# Patient Record
Sex: FEMALE | Race: BLACK OR AFRICAN AMERICAN | Employment: PART TIME | URBAN - METROPOLITAN AREA
[De-identification: names, ages, dates, MRNs, and addresses within clinical notes are randomized per-mention and may not be internally consistent; named-entity substitution may affect disease eponyms.]

---

## 2017-06-10 ENCOUNTER — HOSPITAL ENCOUNTER (EMERGENCY)
Age: 52
Discharge: HOME | End: 2017-06-10
Attending: EMERGENCY MEDICINE
Payer: SELF-PAY

## 2017-06-10 VITALS
DIASTOLIC BLOOD PRESSURE: 81 MMHG | BODY MASS INDEX: 33.66 KG/M2 | SYSTOLIC BLOOD PRESSURE: 137 MMHG | OXYGEN SATURATION: 98 % | WEIGHT: 190 LBS | RESPIRATION RATE: 20 BRPM | HEART RATE: 84 BPM | TEMPERATURE: 97.4 F

## 2017-06-10 DIAGNOSIS — S30.861A TICK BITE OF ABDOMEN, INITIAL ENCOUNTER: Primary | ICD-10-CM
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
## 2017-06-10 PROCEDURE — 99283 EMERGENCY DEPT VISIT LOW MDM: CPT

## 2017-06-10 PROCEDURE — 74011250637 HC RX REV CODE- 250/637: Performed by: EMERGENCY MEDICINE

## 2017-06-10 RX ORDER — DOXYCYCLINE 100 MG/1
100 CAPSULE ORAL 2 TIMES DAILY
Qty: 20 CAP | Refills: 0 | Status: SHIPPED | OUTPATIENT
Start: 2017-06-10 | End: 2017-06-20

## 2017-06-10 RX ORDER — DOXYCYCLINE 100 MG/1
200 CAPSULE ORAL
Status: COMPLETED | OUTPATIENT
Start: 2017-06-10 | End: 2017-06-10

## 2017-06-10 RX ADMIN — DOXYCYCLINE HYCLATE 200 MG: 100 CAPSULE ORAL at 23:15

## 2017-06-11 NOTE — ED NOTES
Bedside and Verbal shift change report given to [address] (oncoming nurse) by Lilly Brambila RN (offgoing nurse). Report included the following information SBAR, Kardex, ED Summary, Procedure Summary, Accordion and Recent Results.

## 2017-06-11 NOTE — ED PROVIDER NOTES
HPI Comments:   10:43 PM Jeni Banks is a 47 y.o. female who presents to the ED for the evaluation of tick bite to her abd. Pt states that she noticed a tick on her abd about a week ago, but saw it fall off inter her hand while scratching this evening. Reports only a small erythremic bump to her abd. No fever, N/V, chills, diaphoresis or generalized myalgias. No relieving or exacerbating factors. No other complaints at this time. PMHx:  has a past medical history of Glaucoma; Glaucoma; Hypercholesterolemia; Hypertension; Miscarriage (); and Uterine fibroid. PCP: Jairo Fabry, MD       The history is provided by the patient. Past Medical History:   Diagnosis Date    Glaucoma     Glaucoma     Hypercholesterolemia     Hypertension     Miscarriage 2010    Uterine fibroid        Past Surgical History:   Procedure Laterality Date    EMBOLIZATION UTERINE FIBROID      HX  SECTION           Family History:   Problem Relation Age of Onset    Colon Polyps Sister     Colon Cancer Sister        Social History     Social History    Marital status:      Spouse name: N/A    Number of children: N/A    Years of education: N/A     Occupational History    Not on file. Social History Main Topics    Smoking status: Former Smoker     Types: Cigarettes     Quit date: 2012    Smokeless tobacco: Never Used    Alcohol use No    Drug use: No    Sexual activity: Not on file     Other Topics Concern    Not on file     Social History Narrative         ALLERGIES: Review of patient's allergies indicates no known allergies. Review of Systems   Constitutional: Negative. HENT: Negative. Eyes: Negative. Respiratory: Negative. Cardiovascular: Negative. Gastrointestinal: Negative. Endocrine: Negative. Genitourinary: Negative. Musculoskeletal: Negative. Skin: Positive for wound. Allergic/Immunologic: Negative. Neurological: Negative. Hematological: Negative. Psychiatric/Behavioral: Negative. All other systems reviewed and are negative. Vitals:    06/10/17 2232   BP: 137/81   Pulse: 84   Resp: 20   Temp: 97.4 °F (36.3 °C)   SpO2: 98%   Weight: 86.2 kg (190 lb)            Physical Exam   Constitutional: She is oriented to person, place, and time. She appears well-developed and well-nourished. No distress. HENT:   Head: Normocephalic. Right Ear: External ear normal.   Left Ear: External ear normal.   Mouth/Throat: No oropharyngeal exudate. Eyes: Conjunctivae and EOM are normal. Pupils are equal, round, and reactive to light. Right eye exhibits no discharge. Left eye exhibits no discharge. No scleral icterus. Neck: Normal range of motion. Neck supple. No JVD present. No tracheal deviation present. No thyromegaly present. Cardiovascular: Normal rate, regular rhythm, normal heart sounds and intact distal pulses. Exam reveals no gallop and no friction rub. No murmur heard. Pulmonary/Chest: Effort normal and breath sounds normal. No stridor. No respiratory distress. She has no wheezes. She has no rales. She exhibits no tenderness. Abdominal: Soft. Bowel sounds are normal. She exhibits no distension and no mass. There is no tenderness. There is no rebound and no guarding. Musculoskeletal: Normal range of motion. She exhibits no edema or tenderness. Lymphadenopathy:     She has no cervical adenopathy. Neurological: She is alert and oriented to person, place, and time. She displays normal reflexes. No cranial nerve deficit. She exhibits normal muscle tone. Coordination normal.   Skin: Skin is warm and dry. No rash noted. She is not diaphoretic. No erythema. No pallor. 1cm area of induration to abd. No FB seen   Nursing note and vitals reviewed.        MDM  Number of Diagnoses or Management Options  Tick bite of abdomen, initial encounter: new and requires workup  Risk of Complications, Morbidity, and/or Mortality  Presenting problems: low  Diagnostic procedures: low  Management options: low    Patient Progress  Patient progress: stable    ED Course       Procedures    Vitals:  Patient Vitals for the past 12 hrs:   Temp Pulse Resp BP SpO2   06/10/17 2232 97.4 °F (36.3 °C) 84 20 137/81 98 %   98% on RA, indicating adequate oxygenation. Medications ordered:   Medications - No data to display      Disposition:   10:45 PM: I have reassessed the patient and discussed their results and diagnosis. Pt will be discharged in stable condition. Patient understands and verbalizes agreement with plan. Diagnosis: No diagnosis found. Follow-up Information     Follow up With Details Comments Contact Info    Jennifer Gordon MD Call in 3 days  [address] [facility]      [address] [facility] EMERGENCY DEPT  As needed, If symptoms worsen [address] [place] [address]  [phone]           Patient's Medications   Start Taking    No medications on file   Continue Taking    BIMATOPROST (LUMIGAN) 0.03 % OPHTHALMIC DROPS    Administer 1 Drop to both eyes nightly. BRINZOLAMIDE (AZOPT) 1 % OPHTHALMIC SUSPENSION    Administer 1 drop to both eyes three (3) times daily. HYDROCHLOROTHIAZIDE (HYDRODIURIL) 25 MG TABLET    Take 1 Tab by mouth daily. IBUPROFEN (MOTRIN IB) 200 MG TABLET    Take 800 mg by mouth every eight (8) hours as needed for Pain. PRAVASTATIN (PRAVACHOL) 20 MG TABLET    Take 1 Tab by mouth nightly. RANITIDINE (ZANTAC) 150 MG TABLET    Take 1 Tab by mouth two (2) times a day. These Medications have changed    No medications on file   Stop Taking    CYCLOBENZAPRINE (FLEXERIL) 10 MG TABLET    Take 1 Tab by mouth three (3) times daily as needed for Muscle Spasm(s). OXYCODONE-ACETAMINOPHEN (PERCOCET) 5-325 MG PER TABLET    Take 1 Tab by mouth every four (4) hours as needed for Pain. Max Daily Amount: 6 Tabs.          SCRIBE ATTESTATION STATEMENT  Documented by: Diane Browne [place] for, and in the presence of, Edison Velázquez MD  10:45 PM   Signed by Leanne Mckeon, 6/10/2017 10:45 PM     PROVIDER ATTESTATION STATEMENT  I personally performed the services described in the documentation, reviewed the documentation, as recorded by the scribe in my presence, and it accurately and completely records my words and actions.   Edison Velázquez MD

## 2017-06-11 NOTE — DISCHARGE INSTRUCTIONS
Tick Bite: Care Instructions  Your Care Instructions    Ticks are small spiderlike animals. They bite to fasten themselves onto your skin and feed on your blood. Ticks can carry diseases. But most ticks do not carry diseases, and most tick bites do not cause serious health problems. Some people may have an allergic reaction to a tick bite. This reaction may be mild, with symptoms like itching and swelling. In rare cases, a severe allergic reaction may occur. Most of the time, all you need to do for a tick bite is relieve any symptoms you may have. Follow-up care is a key part of your treatment and safety. Be sure to make and go to all appointments, and call your doctor if you are having problems. It's also a good idea to know your test results and keep a list of the medicines you take. How can you care for yourself at home? · Put ice or a cold pack on the bite for 15 to 20 minutes once an hour. Put a thin cloth between the ice and your skin. · Try an over-the-counter medicine to relieve itching, redness, swelling, and pain. Be safe with medicines. Read and follow all instructions on the label. ¨ Take an antihistamine medicine, such as chlorpheniramine (Chlor-Trimeton) or diphenhydramine (Benadryl). These medicines may help relieve itching, redness, and swelling. ¨ Use a spray of local anesthetic that contains benzocaine, such as Solarcaine. It may help relieve pain. If your skin reacts to the spray, stop using it. ¨ Put calamine lotion on the skin. It may help relieve itching. To avoid tick bites  · Avoid ticks:  ¨ Learn where ticks are found in your community, and stay away from those areas if possible. ¨ Cover as much of your body as possible when you work or play in grassy or wooded areas. ¨ Use insect repellents, such as products containing DEET. You can spray them on your skin. ¨ Take steps to control ticks on your property if you live in an area where Lyme disease occurs.  Clear leaves, brush, tall grasses, woodpiles, and stone fences from around your house and the edges of your yard or garden. This may help get rid of ticks. · When you come in from outdoors, check your body for ticks, including your groin, head, and underarms. The ticks may be about the size of a sesame seed. If no one else can help you check for ticks on your scalp, comb your hair with a fine-tooth comb. · If you find a tick, remove it quickly. Use tweezers to grasp the tick as close to its mouth (the part in your skin) as possible. Slowly pull the tick straight out--do not twist or yank--until its mouth releases from your skin. · Ticks can come into your house on clothing, outdoor gear, and pets. These ticks can fall off and attach to you. ¨ Check your clothing and outdoor gear. Remove any ticks you find. Then put your clothing in a clothes dryer on high heat for 1 hour to kill any ticks that might remain. ¨ Check your pets for ticks after they have been outdoors. · When hiking in the woods, carry a small dry jar or ziplock bag. If you find a tick on your body, remove the tick and put it in the jar or bag. Store the container in the freezer so you can give it to your doctor if symptoms develop. The tick can be tested to learn whether it is carrying the bacteria that cause Lyme disease. When should you call for help? Call 911 anytime you think you may need emergency care. For example, call if:  · You have symptoms of a severe allergic reaction. These may include:  ¨ Sudden raised, red areas (hives) all over your body. ¨ Swelling of the throat, mouth, lips, or tongue. ¨ Trouble breathing. ¨ Passing out (losing consciousness). Or you may feel very lightheaded or suddenly feel weak, confused, or restless. Call your doctor now or seek immediate medical care if:  · You have signs of infection, such as:  ¨ Increased pain, swelling, warmth, or redness around the bite. ¨ Red streaks leading from the bite.   ¨ Pus draining from the bite. ¨ A fever. Watch closely for changes in your health, and be sure to contact your doctor if:  · You develop a new rash. · You have joint pain. · You are very tired. · You have flu-like symptoms. · You have symptoms for more than 1 week. Where can you learn more? Go to http://josiane-david.info/. Enter C954 in the search box to learn more about \"Tick Bite: Care Instructions. \"  Current as of: May 27, 2016  Content Version: 11.2  © 4346-8809 [employer]. Care instructions adapted under license by [employer] (which disclaims liability or warranty for this information). If you have questions about a medical condition or this instruction, always ask your healthcare professional. [address] any warranty or liability for your use of this information.

## 2017-06-11 NOTE — ED NOTES
Pt instructed to keep wound clean/dry, wash BID with soap/water and apply antibiotic ointment, to take antibiotics without difficulty, and to return for worsening pain/redness/swelling/fever/aches/chills/other concerns.

## 2017-06-23 ENCOUNTER — OFFICE VISIT (OUTPATIENT)
Dept: FAMILY MEDICINE CLINIC | Age: 52
End: 2017-06-23

## 2017-06-23 VITALS
HEIGHT: 63 IN | OXYGEN SATURATION: 95 % | SYSTOLIC BLOOD PRESSURE: 117 MMHG | TEMPERATURE: 98 F | BODY MASS INDEX: 34.55 KG/M2 | HEART RATE: 92 BPM | DIASTOLIC BLOOD PRESSURE: 71 MMHG | WEIGHT: 195 LBS | RESPIRATION RATE: 16 BRPM

## 2017-06-23 DIAGNOSIS — Z80.0 FAMILY HX OF COLON CANCER REQUIRING SCREENING COLONOSCOPY: ICD-10-CM
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
## 2017-06-23 RX ORDER — RANITIDINE 150 MG/1
150 TABLET, FILM COATED ORAL 2 TIMES DAILY
Qty: 180 TAB | Refills: 2 | Status: SHIPPED | OUTPATIENT
Start: 2017-06-23

## 2017-06-23 RX ORDER — PRAVASTATIN SODIUM 20 MG/1
20 TABLET ORAL
Qty: 90 TAB | Refills: 2 | Status: SHIPPED | OUTPATIENT
Start: 2017-06-23

## 2017-06-23 RX ORDER — HYDROCHLOROTHIAZIDE 25 MG/1
25 TABLET ORAL DAILY
Qty: 90 TAB | Refills: 2 | Status: SHIPPED | OUTPATIENT
Start: 2017-06-23

## 2017-06-23 NOTE — PROGRESS NOTES
No chief complaint on file. 1. Have you been to the ER, urgent care clinic since your last visit? Hospitalized since your last visit? Yes When: 6/10/17 Where: [facility] Reason for visit: Tick bite    2. Have you seen or consulted any other health care providers outside of the [address] [place] [address] since your last visit? No    3. When was your last Pap smear? 2015  When was your last Mammogram?2016   When was your last Colon screening? 20012    PMH/FH/Social Hx reviewed and updated as needed      Applicable screenings reviewed and updated as needed  Medication reconciliation performed. Patient does need medication refills. Health Maintenance reviewed.

## 2017-06-23 NOTE — PROGRESS NOTES
HISTORY OF PRESENT ILLNESS  Sheryl Chaves is a 47 y.o. female. Insect Bite   The history is provided by the patient. This is a new problem. Episode onset: Presents for ED f/u after a tick bite. She was seen 2 weeks ago and started on a course of doxycycline which she has completed. Denies any rash. No f/c/s. No ill feeling. No nausea/vomiting/diarrhea. The problem occurs constantly. The problem has been resolved. Pertinent negatives include no chest pain, no abdominal pain, no headaches and no shortness of breath. Nothing aggravates the symptoms. Nothing relieves the symptoms. Review of Systems   Constitutional: Negative. Respiratory: Negative for shortness of breath. Cardiovascular: Negative for chest pain. Gastrointestinal: Negative for abdominal pain. Neurological: Negative for headaches. Physical Exam   Constitutional: She appears well-developed and well-nourished. HENT:   Head: Normocephalic and atraumatic. Cardiovascular: Normal rate, regular rhythm and normal heart sounds. Pulmonary/Chest: Effort normal and breath sounds normal.   Skin: Skin is warm and dry. No rash noted. ASSESSMENT and PLAN  Tick bite: She was treated with doxycycline through the ED. She has not had a rash or other prodromal. Will follow for now.

## 2017-06-23 NOTE — PATIENT INSTRUCTIONS
Learning About How to Have a Healthy Back  What causes back pain? Back pain is often caused by overuse, strain, or injury. For example, people often hurt their backs playing sports or working in the yard, being jolted in a car accident, or lifting something too heavy. Aging plays a part too. Your bones and muscles tend to lose strength as you age, which makes injury more likely. The spongy discs between the bones of the spine (vertebrae) may suffer from wear and tear and no longer provide enough cushion between the bones. A disc that bulges or breaks open (herniated disc) can press on nerves, causing back pain. In some people, back pain is the result of arthritis, broken vertebrae caused by bone loss (osteoporosis), illness, or a spine problem. Although most people have back pain at one time or another, there are steps you can take to make it less likely. How can you have a healthy back? Reduce stress on your back through good posture  Slumping or slouching alone may not cause low back pain. But after the back has been strained or injured, bad posture can make pain worse. · Sleep in a position that maintains your back's normal curves and on a mattress that feels comfortable. Sleep on your side with a pillow between your knees, or sleep on your back with a pillow under your knees. These positions can reduce strain on your back. · Stand and sit up straight. \"Good posture\" generally means your ears, shoulders, and hips are in a straight line. · If you must stand for a long time, put one foot on a stool, ledge, or box. Switch feet every now and then. · Sit in a chair that is low enough to let you place both feet flat on the floor with both knees nearly level with your hips. If your chair or desk is too high, use a footrest to raise your knees. Place a small pillow, a rolled-up towel, or a lumbar roll in the curve of your back if you need extra support.   · Try a kneeling chair, which helps tilt your hips forward. This takes pressure off your lower back. · Try sitting on an exercise ball. It can rock from side to side, which helps keep your back loose. · When driving, keep your knees nearly level with your hips. Sit straight, and drive with both hands on the steering wheel. Your arms should be in a slightly bent position. Reduce stress on your back through careful lifting  · Squat down, bending at the hips and knees only. If you need to, put one knee to the floor and extend your other knee in front of you, bent at a right angle (half kneeling). · Press your chest straight forward. This helps keep your upper back straight while keeping a slight arch in your low back. · Hold the load as close to your body as possible, at the level of your belly button (navel). · Use your feet to change direction, taking small steps. · Lead with your hips as you change direction. Keep your shoulders in line with your hips as you move. · Set down your load carefully, squatting with your knees and hips only. Exercise and stretch your back  · Do some exercise on most days of the week, if your doctor says it is okay. You can walk, run, swim, or cycle. · Stretch your back muscles. Here are a few exercises to try:  Teresa Delgado on your back, and gently pull one bent knee to your chest. Put that foot back on the floor, and then pull the other knee to your chest.  ¨ Do pelvic tilts. Lie on your back with your knees bent. Tighten your stomach muscles. Pull your belly button (navel) in and up toward your ribs. You should feel like your back is pressing to the floor and your hips and pelvis are slightly lifting off the floor. Hold for 6 seconds while breathing smoothly. ¨ Sit with your back flat against a wall. · Keep your core muscles strong. The muscles of your back, belly (abdomen), and buttocks support your spine. ¨ Pull in your belly and imagine pulling your navel toward your spine. Hold this for 6 seconds, then relax.  Remember to keep breathing normally as you tense your muscles. ¨ Do curl-ups. Always do them with your knees bent. Keep your low back on the floor, and curl your shoulders toward your knees using a smooth, slow motion. Keep your arms folded across your chest. If this bothers your neck, try putting your hands behind your neck (not your head), with your elbows spread apart. ¨ Lie on your back with your knees bent and your feet flat on the floor. Tighten your belly muscles, and then push with your feet and raise your buttocks up a few inches. Hold this position 6 seconds as you continue to breathe normally, then lower yourself slowly to the floor. Repeat 8 to 12 times. ¨ If you like group exercise, try Pilates or yoga. These classes have poses that strengthen the core muscles. Lead a healthy lifestyle  · Stay at a healthy weight to avoid strain on your back. · Do not smoke. Smoking increases the risk of osteoporosis, which weakens the spine. If you need help quitting, talk to your doctor about stop-smoking programs and medicines. These can increase your chances of quitting for good. Where can you learn more? Go to http://josianeIdeal Implantdavid.info/. Enter L315 in the search box to learn more about \"Learning About How to Have a Healthy Back. \"  Current as of: March 21, 2017  Content Version: 11.3  © 8461-1882 [employer]. Care instructions adapted under license by [employer] (which disclaims liability or warranty for this information). If you have questions about a medical condition or this instruction, always ask your healthcare professional. [place] [address] any warranty or liability for your use of this information. Back Stretches: Exercises  Your Care Instructions  Here are some examples of exercises for stretching your back. Start each exercise slowly. Ease off the exercise if you start to have pain.   Your doctor or physical therapist will tell you when you can start these exercises and which ones will work best for you. How to do the exercises  Overhead stretch    1. Stand comfortably with your feet shoulder-width apart. 2. Looking straight ahead, raise both arms over your head and reach toward the ceiling. Do not allow your head to tilt back. 3. Hold for 15 to 30 seconds, then lower your arms to your sides. 4. Repeat 2 to 4 times. Side stretch    1. Stand comfortably with your feet shoulder-width apart. 2. Raise one arm over your head, and then lean to the other side. 3. Slide your hand down your leg as you let the weight of your arm gently stretch your side muscles. Hold for 15 to 30 seconds. 4. Repeat 2 to 4 times on each side. Press-up    1. Lie on your stomach, supporting your body with your forearms. 2. Press your elbows down into the floor to raise your upper back. As you do this, relax your stomach muscles and allow your back to arch without using your back muscles. As your press up, do not let your hips or pelvis come off the floor. 3. Hold for 15 to 30 seconds, then relax. 4. Repeat 2 to 4 times. Relax and rest    1. Lie on your back with a rolled towel under your neck and a pillow under your knees. Extend your arms comfortably to your sides. 2. Relax and breathe normally. 3. Remain in this position for about 10 minutes. 4. If you can, do this 2 or 3 times each day. Follow-up care is a key part of your treatment and safety. Be sure to make and go to all appointments, and call your doctor if you are having problems. It's also a good idea to know your test results and keep a list of the medicines you take. Where can you learn more? Go to http://josiane-david.info/. Enter R744 in the search box to learn more about \"Back Stretches: Exercises. \"  Current as of: March 21, 2017  Content Version: 11.3  © 0509-9210 [employer].  Care instructions adapted under license by [employer] (which disclaims liability or warranty for this information). If you have questions about a medical condition or this instruction, always ask your healthcare professional. [place] [address] any warranty or liability for your use of this information.

## 2017-06-23 NOTE — PROGRESS NOTES
Discharge instructions reviewed with patient    Medication list and understanding of medications reviewed with patient. OTC and herbal medications reviewed and added to med list if applicable  Barriers to adherence assessed. Guidance given regarding new medications this visit, including reason for taking this medicine, and common side effects. Given AVS and instructions on   Back stretches and exercises.

## 2017-06-23 NOTE — MR AVS SNAPSHOT
Visit Information Date & Time Provider Department Dept. Phone Encounter #  
 6/23/2017  3:00 PM Ignacio Gibbons, [address] [place] [address] [phone] [id number] Your Appointments 6/23/2017  3:00 PM  
Follow Up with Ignacio Gibbons MD  
[address] [facility]) Appt Note: tick bite [facility] [address] [place]  
  
   
 [facility] [id number] Upcoming Health Maintenance Date Due DTaP/Tdap/Td series (1 - Tdap) 3/28/1987 FOBT Q 1 YEAR AGE 50-75 3/28/2016 INFLUENZA AGE 9 TO ADULT 8/1/2017 PAP AKA CERVICAL CYTOLOGY 2/13/2018 BREAST CANCER SCRN MAMMOGRAM 4/25/2019 Allergies as of 6/23/2017  Review Complete On: 6/23/2017 By: Ignacio Gibbons MD  
 No Known Allergies Current Immunizations  Reviewed on 11/3/2014 Name Date Influenza Vaccine (Quad) PF 1/15/2016 11:00 AM  
 Influenza Vaccine PF 10/31/2014 Not reviewed this visit You Were Diagnosed With   
  
 Codes Comments Tick bite, subsequent encounter    -  Primary ICD-10-CM: W57. Reji Figueroa ICD-9-CM: E906.4 Vitals BP Pulse Temp Resp Height(growth percentile) Weight(growth percentile) 117/71 (BP 1 Location: Left arm, BP Patient Position: Sitting) 92 98 °F (36.7 °C) (Oral) 16 5' 3\" (1.6 m) 195 lb (88.5 kg) LMP SpO2 BMI OB Status Smoking Status 05/03/2017 95% 34.54 kg/m2 Having regular periods Former Smoker Vitals History BMI and BSA Data Body Mass Index Body Surface Area 34.54 kg/m 2 1.98 m 2 Preferred Pharmacy Pharmacy Name Phone [facility], [address] [place] [address] [phone] Your Updated Medication List  
  
   
This list is accurate as of: 6/23/17  1:37 PM.  Always use your most recent med list.  
  
  
  
  
 bimatoprost 0.03 % ophthalmic drops Commonly known as:  LUMIGAN Administer 1 Drop to both eyes nightly. brinzolamide 1 % ophthalmic suspension Commonly known as:  AZOPT Administer 1 drop to both eyes three (3) times daily. hydroCHLOROthiazide 25 mg tablet Commonly known as:  HYDRODIURIL Take 1 Tab by mouth daily. MOTRIN  mg tablet Generic drug:  ibuprofen Take 800 mg by mouth every eight (8) hours as needed for Pain.  
  
 pravastatin 20 mg tablet Commonly known as:  PRAVACHOL Take 1 Tab by mouth nightly. raNITIdine 150 mg tablet Commonly known as:  ZANTAC Take 1 Tab by mouth two (2) times a day. Patient Instructions Learning About How to Have a Healthy Back What causes back pain? Back pain is often caused by overuse, strain, or injury. For example, people often hurt their backs playing sports or working in the yard, being jolted in a car accident, or lifting something too heavy. Aging plays a part too. Your bones and muscles tend to lose strength as you age, which makes injury more likely. The spongy discs between the bones of the spine (vertebrae) may suffer from wear and tear and no longer provide enough cushion between the bones. A disc that bulges or breaks open (herniated disc) can press on nerves, causing back pain. In some people, back pain is the result of arthritis, broken vertebrae caused by bone loss (osteoporosis), illness, or a spine problem. Although most people have back pain at one time or another, there are steps you can take to make it less likely. How can you have a healthy back? Reduce stress on your back through good posture Slumping or slouching alone may not cause low back pain. But after the back has been strained or injured, bad posture can make pain worse. · Sleep in a position that maintains your back's normal curves and on a mattress that feels comfortable. Sleep on your side with a pillow between your knees, or sleep on your back with a pillow under your knees. These positions can reduce strain on your back. · Stand and sit up straight. \"Good posture\" generally means your ears, shoulders, and hips are in a straight line. · If you must stand for a long time, put one foot on a stool, ledge, or box. Switch feet every now and then. · Sit in a chair that is low enough to let you place both feet flat on the floor with both knees nearly level with your hips. If your chair or desk is too high, use a footrest to raise your knees. Place a small pillow, a rolled-up towel, or a lumbar roll in the curve of your back if you need extra support. · Try a kneeling chair, which helps tilt your hips forward. This takes pressure off your lower back. · Try sitting on an exercise ball. It can rock from side to side, which helps keep your back loose. · When driving, keep your knees nearly level with your hips. Sit straight, and drive with both hands on the steering wheel. Your arms should be in a slightly bent position. Reduce stress on your back through careful lifting · Squat down, bending at the hips and knees only. If you need to, put one knee to the floor and extend your other knee in front of you, bent at a right angle (half kneeling). · Press your chest straight forward. This helps keep your upper back straight while keeping a slight arch in your low back. · Hold the load as close to your body as possible, at the level of your belly button (navel). · Use your feet to change direction, taking small steps. · Lead with your hips as you change direction. Keep your shoulders in line with your hips as you move. · Set down your load carefully, squatting with your knees and hips only. Exercise and stretch your back · Do some exercise on most days of the week, if your doctor says it is okay. You can walk, run, swim, or cycle. · Stretch your back muscles. Here are a few exercises to try: ¨ Lie on your back, and gently pull one bent knee to your chest. Put that foot back on the floor, and then pull the other knee to your chest. 
 ¨ Do pelvic tilts. Lie on your back with your knees bent. Tighten your stomach muscles. Pull your belly button (navel) in and up toward your ribs. You should feel like your back is pressing to the floor and your hips and pelvis are slightly lifting off the floor. Hold for 6 seconds while breathing smoothly. ¨ Sit with your back flat against a wall. · Keep your core muscles strong. The muscles of your back, belly (abdomen), and buttocks support your spine. ¨ Pull in your belly and imagine pulling your navel toward your spine. Hold this for 6 seconds, then relax. Remember to keep breathing normally as you tense your muscles. ¨ Do curl-ups. Always do them with your knees bent. Keep your low back on the floor, and curl your shoulders toward your knees using a smooth, slow motion. Keep your arms folded across your chest. If this bothers your neck, try putting your hands behind your neck (not your head), with your elbows spread apart. ¨ Lie on your back with your knees bent and your feet flat on the floor. Tighten your belly muscles, and then push with your feet and raise your buttocks up a few inches. Hold this position 6 seconds as you continue to breathe normally, then lower yourself slowly to the floor. Repeat 8 to 12 times. ¨ If you like group exercise, try Pilates or yoga. These classes have poses that strengthen the core muscles. Lead a healthy lifestyle · Stay at a healthy weight to avoid strain on your back. · Do not smoke. Smoking increases the risk of osteoporosis, which weakens the spine. If you need help quitting, talk to your doctor about stop-smoking programs and medicines. These can increase your chances of quitting for good. Where can you learn more? Go to http://josiane-david.info/. Enter L315 in the search box to learn more about \"Learning About How to Have a Healthy Back. \" Current as of: March 21, 2017 Content Version: 11.3 © 4106-1902 Healthwise, Incorporated. Care instructions adapted under license by [employer] (which disclaims liability or warranty for this information). If you have questions about a medical condition or this instruction, always ask your healthcare professional. [address] any warranty or liability for your use of this information. Back Stretches: Exercises Your Care Instructions Here are some examples of exercises for stretching your back. Start each exercise slowly. Ease off the exercise if you start to have pain. Your doctor or physical therapist will tell you when you can start these exercises and which ones will work best for you. How to do the exercises Overhead stretch 1. Stand comfortably with your feet shoulder-width apart. 2. Looking straight ahead, raise both arms over your head and reach toward the ceiling. Do not allow your head to tilt back. 3. Hold for 15 to 30 seconds, then lower your arms to your sides. 4. Repeat 2 to 4 times. Side stretch 1. Stand comfortably with your feet shoulder-width apart. 2. Raise one arm over your head, and then lean to the other side. 3. Slide your hand down your leg as you let the weight of your arm gently stretch your side muscles. Hold for 15 to 30 seconds. 4. Repeat 2 to 4 times on each side. Press-up 1. Lie on your stomach, supporting your body with your forearms. 2. Press your elbows down into the floor to raise your upper back. As you do this, relax your stomach muscles and allow your back to arch without using your back muscles. As your press up, do not let your hips or pelvis come off the floor. 3. Hold for 15 to 30 seconds, then relax. 4. Repeat 2 to 4 times. Relax and rest 
 
1. Lie on your back with a rolled towel under your neck and a pillow under your knees. Extend your arms comfortably to your sides. 2. Relax and breathe normally. 3. Remain in this position for about 10 minutes. 4. If you can, do this 2 or 3 times each day. Follow-up care is a key part of your treatment and safety. Be sure to make and go to all appointments, and call your doctor if you are having problems. It's also a good idea to know your test results and keep a list of the medicines you take. Where can you learn more? Go to http://josiane-david.info/. Enter U719 in the search box to learn more about \"Back Stretches: Exercises. \" Current as of: March 21, 2017 Content Version: 11.3 © 7102-9920 [employer]. Care instructions adapted under license by [employer] (which disclaims liability or warranty for this information). If you have questions about a medical condition or this instruction, always ask your healthcare professional. [address] any warranty or liability for your use of this information. Introducing [facility] & HEALTH SERVICES! Brenton Mancuso introduces Clutch.io patient portal. Now you can access parts of your medical record, email your doctor's office, and request medication refills online. 1. In your internet browser, go to [email or web address] 2. Click on the First Time User? Click Here link in the Sign In box. You will see the New Member Sign Up page. 3. Enter your Clutch.io Access Code exactly as it appears below. You will not need to use this code after youve completed the sign-up process. If you do not sign up before the expiration date, you must request a new code. · Clutch.io Access Code: [email or web address] Expires: 9/8/2017 10:57 PM 
 
4. Enter the last four digits of your Social Security Number (xxxx) and Date of Birth (mm/dd/yyyy) as indicated and click Submit. You will be taken to the next sign-up page. 5. Create a Loud3rt ID. This will be your Clutch.io login ID and cannot be changed, so think of one that is secure and easy to remember. 6. Create a Loud3rt password. You can change your password at any time. 7. Enter your Password Reset Question and Answer. This can be used at a later time if you forget your password. 8. Enter your e-mail address. You will receive e-mail notification when new information is available in [address] 9. Click Sign Up. You can now view and download portions of your medical record. 10. Click the Download Summary menu link to download a portable copy of your medical information. If you have questions, please visit the Frequently Asked Questions section of the Bundlr website. Remember, Bundlr is NOT to be used for urgent needs. For medical emergencies, dial 911. Now available from your iPhone and Android! Please provide this summary of care documentation to your next provider. Your primary care clinician is listed as Vinny Tarango. If you have any questions after today's visit, please call [phone].

## 2017-11-17 ENCOUNTER — OFFICE VISIT (OUTPATIENT)
Dept: FAMILY MEDICINE CLINIC | Age: 52
End: 2017-11-17

## 2017-11-17 VITALS
RESPIRATION RATE: 19 BRPM | BODY MASS INDEX: 34.2 KG/M2 | TEMPERATURE: 98.1 F | OXYGEN SATURATION: 98 % | DIASTOLIC BLOOD PRESSURE: 90 MMHG | HEIGHT: 63 IN | HEART RATE: 84 BPM | WEIGHT: 193 LBS | SYSTOLIC BLOOD PRESSURE: 140 MMHG

## 2017-11-17 DIAGNOSIS — S93.402A SPRAIN OF LEFT ANKLE, UNSPECIFIED LIGAMENT, INITIAL ENCOUNTER: Primary | ICD-10-CM

## 2017-11-17 DIAGNOSIS — Z23 ENCOUNTER FOR IMMUNIZATION: ICD-10-CM

## 2017-11-17 DIAGNOSIS — Z00.00 HEALTHCARE MAINTENANCE: ICD-10-CM

## 2017-11-17 NOTE — MR AVS SNAPSHOT
Visit Information Date & Time Provider Department Dept. Phone Encounter #  
 11/17/2017  3:00 PM Nasreen Short, [address] [place] [address] [phone] [id number] Upcoming Health Maintenance Date Due DTaP/Tdap/Td series (1 - Tdap) 3/28/1987 FOBT Q 1 YEAR AGE 50-75 3/28/2016 Influenza Age 5 to Adult 8/1/2017 PAP AKA CERVICAL CYTOLOGY 2/13/2018 BREAST CANCER SCRN MAMMOGRAM 4/25/2019 Allergies as of 11/17/2017  Review Complete On: 11/17/2017 By: Nasreen Short MD  
 No Known Allergies Current Immunizations  Reviewed on 11/3/2014 Name Date Influenza Vaccine (Quad) PF 11/17/2017, 1/15/2016 11:00 AM  
 Influenza Vaccine PF 10/31/2014 Not reviewed this visit You Were Diagnosed With   
  
 Codes Comments Healthcare maintenance    -  Primary ICD-10-CM: Z00.00 ICD-9-CM: V70.0 Encounter for immunization     ICD-10-CM: J14 ICD-9-CM: V03.89 Vitals BP Pulse Temp Resp Height(growth percentile) Weight(growth percentile) 140/90 (BP 1 Location: Right arm, BP Patient Position: Sitting) 84 98.1 °F (36.7 °C) (Oral) 19 5' 3\" (1.6 m) 193 lb (87.5 kg) LMP SpO2 BMI OB Status Smoking Status 07/17/2017 (Approximate) 98% 34.19 kg/m2 Having regular periods Former Smoker BMI and BSA Data Body Mass Index Body Surface Area  
 34.19 kg/m 2 1.97 m 2 Preferred Pharmacy Pharmacy Name Phone [facility] [address], [address] [facility] Your Updated Medication List  
  
   
This list is accurate as of: 11/17/17  3:40 PM.  Always use your most recent med list.  
  
  
  
  
 bimatoprost 0.03 % ophthalmic drops Commonly known as:  LUMIGAN Administer 1 Drop to both eyes nightly. brinzolamide 1 % ophthalmic suspension Commonly known as:  AZOPT Administer 1 drop to both eyes three (3) times daily. hydroCHLOROthiazide 25 mg tablet Commonly known as:  HYDRODIURIL  
 Take 1 Tab by mouth daily. MOTRIN  mg tablet Generic drug:  ibuprofen Take 800 mg by mouth every eight (8) hours as needed for Pain.  
  
 pravastatin 20 mg tablet Commonly known as:  PRAVACHOL Take 1 Tab by mouth nightly. raNITIdine 150 mg tablet Commonly known as:  ZANTAC Take 1 Tab by mouth two (2) times a day. We Performed the Following INFLUENZA VIRUS VAC QUAD,SPLIT,PRESV FREE SYRINGE IM [id number] CPT(R)] To-Do List   
 12/17/2017 Lab:  OCCULT BLOOD, IMMUNOASSAY (FIT) Patient Instructions Little Leaguer's Elbow (Medial Apophysitis): Exercises Your Care Instructions Here are some examples of typical rehabilitation exercises for your condition. Start each exercise slowly. Ease off the exercise if you start to have pain. Your doctor or physical therapist will tell you when you can start these exercises and which ones will work best for you. How to do the exercises Wrist flexor stretch 1. Extend your affected arm in front of you. Your palm should face away from your body. 2. Bend back your wrist on your affected arm, pointing your hand up toward the ceiling. 3. With your other hand, gently bend your wrist farther until you feel a mild to moderate stretch in your forearm. 4. Hold for at least 15 to 30 seconds. 5. Repeat 2 to 4 times. 6. Repeat steps 1 through 5. But this time extend your affected arm in front of you with your palm facing up. Then bend back your wrist, pointing your hand toward the floor. Resisted wrist extension For the following exercises, you will need elastic exercise material, such as surgical tubing or Thera-Band. 1. Sit leaning forward with your legs slightly spread. Then place your affected forearm on your thigh with your hand and wrist in front of your knee. 2. Grasp one end of an exercise band with your palm down. Step on the other end. 3. Slowly bend your wrist upward for a count of 2. Then lower your wrist slowly to a count of 5. 
4. Repeat 8 to 12 times. Resisted wrist flexion 1. Sit leaning forward with your legs slightly spread. Then place your affected forearm on your thigh with your hand and wrist in front of your knee. 2. Grasp one end of an exercise band with your palm up. Step on the other end. 
3. Slowly bend your wrist upward for a count of 2. Then lower your wrist slowly to a count of 5. 
4. Repeat 8 to 12 times. Resisted radial deviation 1. Sit leaning forward with your legs slightly spread. Then place your affected forearm on your thigh with your hand and wrist in front of your knee. 2. Grasp one end of an exercise band with your hand facing toward your other thigh. Step on the other end of the band. 3. Slowly bend your wrist upward for a count of 2. Then lower your wrist slowly to a count of 5. 
4. Repeat 8 to 12 times. Resisted ulnar deviation 1. Sit leaning forward with your legs slightly spread. Then place your affected forearm on your thigh with your hand and wrist by the inside of your knee. 2. Grasp one end of an exercise band with your palm down. Step on the other end with the foot opposite the hand holding the band. 3. Slowly bend your wrist outward and toward your knee for a count of 2. Then slowly move your wrist back to the starting position to a count of 5. 
4. Repeat 8 to 12 times. Resisted forearm supination 1. Sit leaning forward with your legs slightly spread. Then place your affected forearm on your thigh with your hand and wrist in front of your knee. 2. Grasp one end of an exercise band with your palm down. Step on the other end. 3. Keeping your wrist straight, roll your palm outward and away from the inside of your thigh for a count of 2. Then slowly move your wrist back to the starting position to a count of 5. 
4. Repeat 8 to 12 times. Resisted forearm pronation 1. Sit leaning forward with your legs slightly spread. Then place your affected forearm on your thigh with your hand and wrist in front of your knee. 2. Grasp one end of an exercise band with your palm up. Step on the other end. 3. Keeping your wrist straight, roll your palm inward toward your thigh for a count of 2. Then slowly move your wrist back to the starting position to a count of 5. 
4. Repeat 8 to 12 times. Follow-up care is a key part of your treatment and safety. Be sure to make and go to all appointments, and call your doctor if you are having problems. It's also a good idea to know your test results and keep a list of the medicines you take. Where can you learn more? Go to http://josiane-david.info/. Enter R084 in the search box to learn more about \"Little Leaguer's Elbow (Medial Apophysitis): Exercises. \" Current as of: March 21, 2017 Content Version: 11.4 © 9667-2820 Xigen. Care instructions adapted under license by [employer] (which disclaims liability or warranty for this information). If you have questions about a medical condition or this instruction, always ask your healthcare professional. [place] [address] any warranty or liability for your use of this information. Introducing [facility] & HEALTH SERVICES! [facility] introduces [employer] patient portal. Now you can access parts of your medical record, email your doctor's office, and request medication refills online. 1. In your internet browser, go to [email or web address] 2. Click on the First Time User? Click Here link in the Sign In box. You will see the New Member Sign Up page. 3. Enter your Black Rhino Games Access Code exactly as it appears below. You will not need to use this code after youve completed the sign-up process. If you do not sign up before the expiration date, you must request a new code. · [employer] Access Code: [email or web address] Expires: 2/15/2018  3:40 PM 
 
4. Enter the last four digits of your Social Security Number (xxxx) and Date of Birth (mm/dd/yyyy) as indicated and click Submit. You will be taken to the next sign-up page. 5. Create a Ulta Beauty ID. This will be your Ulta Beauty login ID and cannot be changed, so think of one that is secure and easy to remember. 6. Create a Ulta Beauty password. You can change your password at any time. 7. Enter your Password Reset Question and Answer. This can be used at a later time if you forget your password. 8. Enter your e-mail address. You will receive e-mail notification when new information is available in [address] 9. Click Sign Up. You can now view and download portions of your medical record. 10. Click the Download Summary menu link to download a portable copy of your medical information. If you have questions, please visit the Frequently Asked Questions section of the Ulta Beauty website. Remember, Ulta Beauty is NOT to be used for urgent needs. For medical emergencies, dial 911. Now available from your iPhone and Android! Please provide this summary of care documentation to your next provider. Your primary care clinician is listed as Delfina Schwartz. If you have any questions after today's visit, please call [phone].

## 2017-11-17 NOTE — PROGRESS NOTES
Chief Complaint   Patient presents with    Ankle Pain     left ankle pain x 3 weeks- intermittent pain     1. Have you been to the ER, urgent care clinic since your last visit? Hospitalized since your last visit? No    2. Have you seen or consulted any other health care providers outside of the [address] [place] [address] since your last visit? No    3. Patient had PAP/MAMMO completed  8/2017- will forward records  When was your last Pap smear? When was your last Mammogram?   When was your last Colon screening? Colon Screening 2012 - Fit Kit ordered. PMH/FH/Social Hx reviewed and updated as needed      Applicable screenings reviewed and updated as needed  Medication reconciliation performed. Patient DOES  need medication refills. Health Maintenance reviewed.

## 2017-11-17 NOTE — PATIENT INSTRUCTIONS
Little Leaguer's Elbow (Medial Apophysitis): Exercises  Your Care Instructions  Here are some examples of typical rehabilitation exercises for your condition. Start each exercise slowly. Ease off the exercise if you start to have pain. Your doctor or physical therapist will tell you when you can start these exercises and which ones will work best for you. How to do the exercises  Wrist flexor stretch    1. Extend your affected arm in front of you. Your palm should face away from your body. 2. Bend back your wrist on your affected arm, pointing your hand up toward the ceiling. 3. With your other hand, gently bend your wrist farther until you feel a mild to moderate stretch in your forearm. 4. Hold for at least 15 to 30 seconds. 5. Repeat 2 to 4 times. 6. Repeat steps 1 through 5. But this time extend your affected arm in front of you with your palm facing up. Then bend back your wrist, pointing your hand toward the floor. Resisted wrist extension    For the following exercises, you will need elastic exercise material, such as surgical tubing or Thera-Band. 1. Sit leaning forward with your legs slightly spread. Then place your affected forearm on your thigh with your hand and wrist in front of your knee. 2. Grasp one end of an exercise band with your palm down. Step on the other end.  3. Slowly bend your wrist upward for a count of 2. Then lower your wrist slowly to a count of 5.  4. Repeat 8 to 12 times. Resisted wrist flexion    1. Sit leaning forward with your legs slightly spread. Then place your affected forearm on your thigh with your hand and wrist in front of your knee. 2. Grasp one end of an exercise band with your palm up. Step on the other end.  3. Slowly bend your wrist upward for a count of 2. Then lower your wrist slowly to a count of 5.  4. Repeat 8 to 12 times. Resisted radial deviation    1. Sit leaning forward with your legs slightly spread.  Then place your affected forearm on your thigh with your hand and wrist in front of your knee. 2. Grasp one end of an exercise band with your hand facing toward your other thigh. Step on the other end of the band. 3. Slowly bend your wrist upward for a count of 2. Then lower your wrist slowly to a count of 5.  4. Repeat 8 to 12 times. Resisted ulnar deviation    1. Sit leaning forward with your legs slightly spread. Then place your affected forearm on your thigh with your hand and wrist by the inside of your knee. 2. Grasp one end of an exercise band with your palm down. Step on the other end with the foot opposite the hand holding the band. 3. Slowly bend your wrist outward and toward your knee for a count of 2. Then slowly move your wrist back to the starting position to a count of 5.  4. Repeat 8 to 12 times. Resisted forearm supination    1. Sit leaning forward with your legs slightly spread. Then place your affected forearm on your thigh with your hand and wrist in front of your knee. 2. Grasp one end of an exercise band with your palm down. Step on the other end. 3. Keeping your wrist straight, roll your palm outward and away from the inside of your thigh for a count of 2. Then slowly move your wrist back to the starting position to a count of 5.  4. Repeat 8 to 12 times. Resisted forearm pronation    1. Sit leaning forward with your legs slightly spread. Then place your affected forearm on your thigh with your hand and wrist in front of your knee. 2. Grasp one end of an exercise band with your palm up. Step on the other end. 3. Keeping your wrist straight, roll your palm inward toward your thigh for a count of 2. Then slowly move your wrist back to the starting position to a count of 5.  4. Repeat 8 to 12 times. Follow-up care is a key part of your treatment and safety. Be sure to make and go to all appointments, and call your doctor if you are having problems.  It's also a good idea to know your test results and keep a list of the medicines you take. Where can you learn more? Go to http://josiane-david.info/. Enter K123 in the search box to learn more about \"Little Leaguer's Elbow (Medial Apophysitis): Exercises. \"  Current as of: March 21, 2017  Content Version: 11.4  © 6987-1482 [employer]. Care instructions adapted under license by [facility] (which disclaims liability or warranty for this information). If you have questions about a medical condition or this instruction, always ask your healthcare professional. [address] any warranty or liability for your use of this information.

## 2017-11-17 NOTE — PROGRESS NOTES
Kelly Dempsey is a 47 y.o. female who presents for routine immunizations. She denies any symptoms , reactions or allergies that would exclude them from being immunized today. Risks and adverse reactions were discussed and the VIS was given to them. All questions were addressed. She was observed for 15  min post injection. There were no reactions observed. Lucina Wong RN      Discharge instructions reviewed with patient    Medication list and understanding of medications reviewed with patient. OTC and herbal medications reviewed and added to med list if applicable  Barriers to adherence assessed. Guidance given regarding new medications this visit, including reason for taking this medicine, and common side effects.     Given AVS with instructions for arm exercises

## 2017-11-21 NOTE — PROGRESS NOTES
HISTORY OF PRESENT ILLNESS  Sheryl Rolle is a 47 y.o. female. Ankle Pain    The history is provided by the patient. This is a new problem. Episode onset: Presents with cc of L ankle pain. She reports she recently sprained the ankle and treated it conservatively with a brace with good effect. Her symptoms are largely resolved. Episode frequency: Continues to have minimal pain. No swelling. Able to walk normally. The problem has been rapidly improving. The quality of the pain is described as aching. The pain is mild. Pertinent negatives include no numbness, full range of motion and no stiffness. Review of Systems   Constitutional: Negative. Musculoskeletal: Negative for stiffness. Neurological: Negative for numbness. Physical Exam   Constitutional: She appears well-developed and well-nourished. Musculoskeletal:   L ankle:  No edema or bony deformity. ROM wnl. Mild TTP inferior to lateral malleolus. Strength 5/5       ASSESSMENT and PLAN  L ankle sprain: Nearly resolved. Continue with current care. F/u if symptoms persist or worsen.

## 2021-05-07 ENCOUNTER — OFFICE VISIT (OUTPATIENT)
Dept: ORTHOPEDIC SURGERY | Age: 56
End: 2021-05-07
Payer: COMMERCIAL

## 2021-05-07 ENCOUNTER — DOCUMENTATION ONLY (OUTPATIENT)
Dept: ORTHOPEDIC SURGERY | Age: 56
End: 2021-05-07

## 2021-05-07 VITALS — TEMPERATURE: 97.7 F | OXYGEN SATURATION: 100 % | HEART RATE: 85 BPM | BODY MASS INDEX: 34.83 KG/M2 | WEIGHT: 196.6 LBS

## 2021-05-07 DIAGNOSIS — M65.311 TRIGGER THUMB OF RIGHT HAND: Primary | ICD-10-CM

## 2021-05-07 PROCEDURE — 99203 OFFICE O/P NEW LOW 30 MIN: CPT | Performed by: ORTHOPAEDIC SURGERY

## 2021-05-07 RX ORDER — VITAMIN E 1000 UNIT
CAPSULE ORAL
COMMUNITY

## 2021-05-07 RX ORDER — AMLODIPINE BESYLATE 5 MG/1
TABLET ORAL
COMMUNITY
Start: 2020-07-02

## 2021-05-07 RX ORDER — ASPIRIN 325 MG
500 TABLET, DELAYED RELEASE (ENTERIC COATED) ORAL
COMMUNITY

## 2021-05-07 RX ORDER — METOPROLOL SUCCINATE 25 MG/1
TABLET, EXTENDED RELEASE ORAL
COMMUNITY
Start: 2021-04-06

## 2021-05-07 RX ORDER — ASPIRIN 81 MG/1
81 TABLET ORAL DAILY
COMMUNITY

## 2021-05-07 RX ORDER — CHOLECALCIFEROL (VITAMIN D3) 125 MCG
CAPSULE ORAL
COMMUNITY

## 2021-05-07 NOTE — PROGRESS NOTES
Yossi Benoit is a 55 y.o. female right handed unspecified employment. Worker's Compensation and legal considerations: none filed. Vitals:    21 1205   Pulse: 85   Temp: 97.7 °F (36.5 °C)   SpO2: 100%   Weight: 196 lb 9.6 oz (89.2 kg)   PainSc:   6   PainLoc: Finger           Chief Complaint   Patient presents with    Thumb Pain     Right         HPI: Patient presents today with complaints of right thumb pain and locking. She previously has had right thumb A1 pulley injections. The first one last year lasted for about 6 months and she has had one recently in December that did not help at all. Date of onset: 2020    Injury: No    Prior Treatment:  Yes: Comment: Right thumb A1 pulley injection by another provider    Numbness/ Tingling: No      ROS: Review of Systems - General ROS: negative  Psychological ROS: negative  ENT ROS: negative  Allergy and Immunology ROS: negative  Hematological and Lymphatic ROS: negative  Respiratory ROS: no cough, shortness of breath, or wheezing  Cardiovascular ROS: no chest pain or dyspnea on exertion  Gastrointestinal ROS: no abdominal pain, change in bowel habits, or black or bloody stools  Musculoskeletal ROS: positive for - pain in thumb - right  Neurological ROS: negative  Dermatological ROS: negative    Past Medical History:   Diagnosis Date    Glaucoma     Glaucoma     Hypercholesterolemia     Hypertension     Miscarriage 2010    Uterine fibroid        Past Surgical History:   Procedure Laterality Date    EMBOLIZATION UTERINE FIBROID      HX  SECTION         Current Outpatient Medications   Medication Sig Dispense Refill    aspirin delayed-release 81 mg tablet Take 81 mg by mouth daily.       amLODIPine (NORVASC) 5 mg tablet amlodipine 5 mg tablet   TAKE 1 TABLET BY MOUTH ONCE DAILY FOR 90 DAYS      metoprolol succinate (TOPROL-XL) 25 mg XL tablet metoprolol succinate ER 25 mg tablet,extended release 24 hr   TAKE 1 2 (ONE HALF) TABLET BY MOUTH ONCE DAILY      pravastatin (PRAVACHOL) 20 mg tablet Take 1 Tab by mouth nightly. 90 Tab 2    hydroCHLOROthiazide (HYDRODIURIL) 25 mg tablet Take 1 Tab by mouth daily. 90 Tab 2    bimatoprost (LUMIGAN) 0.03 % ophthalmic drops Administer 1 Drop to both eyes nightly. 2.5 mL 2    ibuprofen (MOTRIN IB) 200 mg tablet Take 800 mg by mouth every eight (8) hours as needed for Pain.  omega 3-dha-epa-fish oil 60- mg cap Take 500 mg by mouth.  ascorbic acid, vitamin C, (Vitamin C) 1,000 mg tablet       cholecalciferol, vitamin D3, (Vitamin D3) 50 mcg (2,000 unit) tab 1 capsule      raNITIdine (ZANTAC) 150 mg tablet Take 1 Tab by mouth two (2) times a day. 180 Tab 2    brinzolamide (AZOPT) 1 % ophthalmic suspension Administer 1 drop to both eyes three (3) times daily. No Known Allergies        PE:     Physical Exam  Vitals signs and nursing note reviewed. Constitutional:       General: She is not in acute distress. Appearance: Normal appearance. She is not ill-appearing. Neck:      Musculoskeletal: Normal range of motion. Cardiovascular:      Pulses: Normal pulses. Pulmonary:      Effort: Pulmonary effort is normal.   Musculoskeletal: Normal range of motion. General: Swelling and tenderness present. No deformity or signs of injury. Right lower leg: No edema. Left lower leg: No edema. Skin:     General: Skin is warm and dry. Capillary Refill: Capillary refill takes less than 2 seconds. Findings: No bruising or erythema. Neurological:      General: No focal deficit present. Mental Status: She is alert and oriented to person, place, and time.    Psychiatric:         Mood and Affect: Mood normal.            Hand:    Examination L Digit(s) R Digit(s)   1st CMC Tenderness -  -    1st CMC Grind -  -    Melo Nodes -  -    [place] Nodes -  -    A1 Pulley Tenderness -  + Th   Triggering -  +    UCL Instability -  -    RCL Instability -  - Lateral Stress Pain -  -    Palmar Cords -  -    Tabletop test -  -    Garrod's Pads -  -     Strength       Pinch Strength         ROM: Full        Imaging:     None indicated        ICD-10-CM ICD-9-CM    1. Trigger thumb of right hand  M65.311 727.03 SCHEDULE SURGERY         Plan:     Schedule right thumb A1 pulley release the patient's earliest convenience. Follow-up and Dispositions    · Return for H&P and consent.           Plan was reviewed with patient, who verbalized agreement and understanding of the plan

## 2021-11-30 ENCOUNTER — HOSPITAL ENCOUNTER (EMERGENCY)
Age: 56
Discharge: HOME | End: 2021-11-30
Attending: STUDENT IN AN ORGANIZED HEALTH CARE EDUCATION/TRAINING PROGRAM
Payer: COMMERCIAL

## 2021-11-30 ENCOUNTER — APPOINTMENT (OUTPATIENT)
Dept: VASCULAR SURGERY | Age: 56
End: 2021-11-30
Attending: STUDENT IN AN ORGANIZED HEALTH CARE EDUCATION/TRAINING PROGRAM
Payer: COMMERCIAL

## 2021-11-30 VITALS
RESPIRATION RATE: 18 BRPM | HEART RATE: 85 BPM | WEIGHT: 200 LBS | OXYGEN SATURATION: 100 % | DIASTOLIC BLOOD PRESSURE: 76 MMHG | SYSTOLIC BLOOD PRESSURE: 142 MMHG | TEMPERATURE: 98.4 F | HEIGHT: 63 IN | BODY MASS INDEX: 35.44 KG/M2

## 2021-11-30 DIAGNOSIS — M79.662 PAIN OF LEFT CALF: Primary | ICD-10-CM

## 2021-11-30 LAB — D DIMER PPP FEU-MCNC: 0.94 UG/ML(FEU)

## 2021-11-30 PROCEDURE — 99282 EMERGENCY DEPT VISIT SF MDM: CPT

## 2021-11-30 PROCEDURE — 85379 FIBRIN DEGRADATION QUANT: CPT

## 2021-11-30 PROCEDURE — 93971 EXTREMITY STUDY: CPT

## 2021-11-30 RX ORDER — DICLOFENAC SODIUM 10 MG/G
4 GEL TOPICAL 4 TIMES DAILY
Qty: 1 EACH | Refills: 3 | Status: SHIPPED | OUTPATIENT
Start: 2021-11-30

## 2021-11-30 NOTE — DISCHARGE INSTRUCTIONS
Take tylenol 500mg every 6-8 hours as needed for pain. Use the voltaren gel to manage your pain, massage it in. Follow up with your PCP as needed.

## 2021-11-30 NOTE — ED PROVIDER NOTES
EMERGENCY DEPARTMENT HISTORY AND PHYSICAL EXAM    1:00 PM    Date: 11/30/2021  Patient Name: Chuck White    History of Presenting Illness     Chief Complaint   Patient presents with    Leg Pain       History Provided By: Patient  Location/Duration/Severity/Modifying factors   HPI  Chuck White is a 55 y.o. female with asthma history of hypertension presenting for evaluation of left calf pain. Patient says that the day after Thanksgiving, approximately 4 days ago she started to have some pain in the left calf, mild to moderate. It is worse when she is walking, and when she does so it radiates up towards her knee/thigh and down towards her foot. She says that at night this leg swells, but then it goes down during the day. She denies any trauma to this area. Has not been taking anything for the pain because she does not like to take pain medication. She saw her PCP earlier today who referred her to the emergency department for evaluation of potential DVT. She has no history of DVT or PE, reports no long distance travel but is a smoker. She denies any cough, hemoptysis, chest pain or shortness of breath. No other medical complaints. PCP: Ashley Benavides MD    Current Outpatient Medications   Medication Sig Dispense Refill    diclofenac (VOLTAREN) 1 % gel Apply 4 g to affected area four (4) times daily. Use dosing card to measure dose. Do not apply to open wounds 1 Each 3    aspirin delayed-release 81 mg tablet Take 81 mg by mouth daily.  omega 3-dha-epa-fish oil 60- mg cap Take 500 mg by mouth.       ascorbic acid, vitamin C, (Vitamin C) 1,000 mg tablet       cholecalciferol, vitamin D3, (Vitamin D3) 50 mcg (2,000 unit) tab 1 capsule      amLODIPine (NORVASC) 5 mg tablet amlodipine 5 mg tablet   TAKE 1 TABLET BY MOUTH ONCE DAILY FOR 90 DAYS      metoprolol succinate (TOPROL-XL) 25 mg XL tablet metoprolol succinate ER 25 mg tablet,extended release 24 hr   TAKE 1 2 (ONE HALF) TABLET BY MOUTH ONCE DAILY      pravastatin (PRAVACHOL) 20 mg tablet Take 1 Tab by mouth nightly. 90 Tab 2    hydroCHLOROthiazide (HYDRODIURIL) 25 mg tablet Take 1 Tab by mouth daily. 90 Tab 2    raNITIdine (ZANTAC) 150 mg tablet Take 1 Tab by mouth two (2) times a day. 180 Tab 2    bimatoprost (LUMIGAN) 0.03 % ophthalmic drops Administer 1 Drop to both eyes nightly. 2.5 mL 2    ibuprofen (MOTRIN IB) 200 mg tablet Take 800 mg by mouth every eight (8) hours as needed for Pain.  brinzolamide (AZOPT) 1 % ophthalmic suspension Administer 1 drop to both eyes three (3) times daily. Past History     Past Medical History:  Past Medical History:   Diagnosis Date    Glaucoma     Glaucoma     Hypercholesterolemia     Hypertension     Miscarriage 2010    Uterine fibroid        Past Surgical History:  Past Surgical History:   Procedure Laterality Date    EMBOLIZATION UTERINE FIBROID      HX  SECTION         Family History:  Family History   Problem Relation Age of Onset    Colon Polyps Sister     Colon Cancer Sister     Heart Disease Mother        Social History:  Social History     Tobacco Use    Smoking status: Former Smoker     Types: Cigarettes     Quit date: 2012     Years since quittin.1    Smokeless tobacco: Never Used   Substance Use Topics    Alcohol use: No    Drug use: No       Allergies:  No Known Allergies    I reviewed and confirmed the above information with patient and updated as necessary. Review of Systems     Review of Systems   Constitutional: Negative for diaphoresis and fever. HENT: Negative for ear pain and sore throat. Eyes:        No acute change in vision   Respiratory: Negative for cough and shortness of breath. Cardiovascular: Negative for chest pain and leg swelling. Gastrointestinal: Negative for abdominal pain and vomiting. Genitourinary: Negative for dysuria. Musculoskeletal: Positive for myalgias. Negative for neck pain.    Skin: Negative for wound. Neurological: Negative for weakness and headaches. Physical Exam     Visit Vitals  BP (!) 142/76 (BP 1 Location: Left upper arm, BP Patient Position: At rest)   Pulse 85   Temp 98.4 °F (36.9 °C)   Resp 18   Ht 5' 3\" (1.6 m)   Wt 90.7 kg (200 lb)   SpO2 100%   BMI 35.43 kg/m²       Physical Exam  Vitals and nursing note reviewed. Constitutional:       Comments: Pleasant adult female resting comfortably   HENT:      Mouth/Throat:      Mouth: Mucous membranes are moist.   Eyes:      Pupils: Pupils are equal, round, and reactive to light. Cardiovascular:      Rate and Rhythm: Normal rate and regular rhythm. Pulses: Normal pulses. Comments: DP and TP pulses 2+ bilaterally  Pulmonary:      Effort: Pulmonary effort is normal.      Breath sounds: Normal breath sounds. Abdominal:      Palpations: Abdomen is soft. Tenderness: There is no abdominal tenderness. Musculoskeletal:         General: Tenderness (Tenderness to the left calf, no muscle spasm appreciated. Small area of ecchymosis about the size of a quarter overlying this area.) present. No swelling or signs of injury. Normal range of motion. Cervical back: Normal range of motion. Skin:     General: Skin is warm. Neurological:      General: No focal deficit present. Mental Status: She is alert and oriented to person, place, and time. Mental status is at baseline. Diagnostic Study Results     Labs -  Recent Results (from the past 24 hour(s))   D DIMER    Collection Time: 11/30/21  1:00 PM   Result Value Ref Range    D DIMER 0.94 (H) <0.46 ug/ml(FEU)         Radiologic Studies -   DUPLEX LOWER EXT VENOUS LEFT    (Results Pending)           Medical Decision Making   I am the first provider for this patient. I reviewed the vital signs, available nursing notes, past medical history, past surgical history, family history and social history.     Vital Signs-Reviewed the patient's vital signs. Records Reviewed: Nursing Notes and Old Medical Records (Time of Review: 1:00 PM)    Provider Notes (Medical Decision Making):   MDM  71-year-old female here with left calf pain, potential for muscle strain but sent here by PCP to rule out DVT. Doubt fracture. Strong pulses symmetrically, doubt arterial insufficiency. No chest pain or shortness of breath to suggest PE.    ED Course: Progress Notes, Reevaluation, and Consults:  Patient is afebrile, slightly hypertensive but otherwise hemodynamically normal  Exam is reassuring, as noted above    We will obtain a D dimer, and escalate work-up if necessary. Do not feel that x-rays are indicated. D-dimer is positive however PVL shows no acute DVT    Patient appropriate discharge home, will send with Voltaren gel, Tylenol. Signs and symptoms prompting return to the ED were discussed. She was discharged home in stable condition. Procedures    Diagnosis     Clinical Impression:   1. Pain of left calf        Disposition: Home    Follow-up Information     Follow up With Specialties Details Why Contact Info    Claudia Nunn MD Family Medicine Schedule an appointment as soon as possible for a visit   [address] [place], [place] [address] [phone] [address] [facility] EMERGENCY DEPT Emergency Medicine  As needed, If symptoms worsen [address] [facility]  [phone]           Patient's Medications   Start Taking    DICLOFENAC (VOLTAREN) 1 % GEL    Apply 4 g to affected area four (4) times daily. Use dosing card to measure dose. Do not apply to open wounds   Continue Taking    AMLODIPINE (NORVASC) 5 MG TABLET    amlodipine 5 mg tablet   TAKE 1 TABLET BY MOUTH ONCE DAILY FOR 90 DAYS    ASCORBIC ACID, VITAMIN C, (VITAMIN C) 1,000 MG TABLET        ASPIRIN DELAYED-RELEASE 81 MG TABLET    Take 81 mg by mouth daily. BIMATOPROST (LUMIGAN) 0.03 % OPHTHALMIC DROPS    Administer 1 Drop to both eyes nightly.     BRINZOLAMIDE (AZOPT) 1 % OPHTHALMIC SUSPENSION    Administer 1 drop to both eyes three (3) times daily. CHOLECALCIFEROL, VITAMIN D3, (VITAMIN D3) 50 MCG (2,000 UNIT) TAB    1 capsule    HYDROCHLOROTHIAZIDE (HYDRODIURIL) 25 MG TABLET    Take 1 Tab by mouth daily. IBUPROFEN (MOTRIN IB) 200 MG TABLET    Take 800 mg by mouth every eight (8) hours as needed for Pain. METOPROLOL SUCCINATE (TOPROL-XL) 25 MG XL TABLET    metoprolol succinate ER 25 mg tablet,extended release 24 hr   TAKE 1 2 (ONE HALF) TABLET BY MOUTH ONCE DAILY    OMEGA 3-DHA-EPA-FISH OIL 60- MG CAP    Take 500 mg by mouth. PRAVASTATIN (PRAVACHOL) 20 MG TABLET    Take 1 Tab by mouth nightly. RANITIDINE (ZANTAC) 150 MG TABLET    Take 1 Tab by mouth two (2) times a day. These Medications have changed    No medications on file   Stop Taking    No medications on file       Manolo Page MD   Emergency Medicine   November 30, 2021, 1:00 PM     This note is dictated utilizing Dragon voice recognition software. Unfortunately this leads to occasional typographical errors using the voice recognition. I apologize in advance if the situation occurs. If questions occur please do not hesitate to contact me directly.     Diana Dinh MD

## 2021-11-30 NOTE — ED TRIAGE NOTES
Pt c/o of left leg pain starting last Friday. The pain the pt states starts in her calf & shoots up her knee & thigh while walking. Pt sent her by PCP to be evaluated for blood clot.

## 2023-04-13 PROBLEM — D21.9 FIBROIDS: Status: ACTIVE | Noted: 2023-04-13

## 2023-04-13 PROBLEM — R26.89 IMBALANCE: Status: ACTIVE | Noted: 2023-04-13

## 2023-04-13 PROBLEM — M43.16 SPONDYLOLISTHESIS AT L4-L5 LEVEL: Status: ACTIVE | Noted: 2023-04-13

## 2023-07-20 ENCOUNTER — OFFICE VISIT (OUTPATIENT)
Age: 58
End: 2023-07-20
Payer: COMMERCIAL

## 2023-07-20 VITALS
OXYGEN SATURATION: 97 % | HEIGHT: 63 IN | WEIGHT: 203.8 LBS | HEART RATE: 103 BPM | TEMPERATURE: 97.4 F | BODY MASS INDEX: 36.11 KG/M2

## 2023-07-20 DIAGNOSIS — M43.16 SPONDYLOLISTHESIS AT L4-L5 LEVEL: Primary | ICD-10-CM

## 2023-07-20 DIAGNOSIS — R20.0 NUMBNESS AND TINGLING OF BOTH FEET: ICD-10-CM

## 2023-07-20 DIAGNOSIS — R20.2 NUMBNESS AND TINGLING OF BOTH FEET: ICD-10-CM

## 2023-07-20 DIAGNOSIS — M54.50 LUMBAR PAIN: ICD-10-CM

## 2023-07-20 PROCEDURE — 99213 OFFICE O/P EST LOW 20 MIN: CPT | Performed by: NURSE PRACTITIONER

## 2023-07-20 RX ORDER — BIMATOPROST 0.1 MG/ML
SOLUTION/ DROPS OPHTHALMIC
COMMUNITY
Start: 2023-05-19

## 2023-07-20 RX ORDER — PREGABALIN 75 MG/1
CAPSULE ORAL
Qty: 90 CAPSULE | Refills: 1 | Status: SHIPPED | OUTPATIENT
Start: 2023-07-20 | End: 2023-09-18

## 2023-07-20 NOTE — PROGRESS NOTES
Chief complaint   Chief Complaint   Patient presents with    Back Problem       History of Present Illness:  Milo Witt is a  63 y.o.  female who comes in today after last being seen as a new patient by Dr. Mark Novak on April 13, 2023. She has back pain with leg pain that is burning in nature. The leg pain is intermittent. The back pain is worse than the leg pain. She states the leg pain radiates into the anterior thighs to just below her knees. She does get some numbness and tingling. Dr. Sabina Doe started her on Lyrica 75 mg twice a day which she states did help a little bit. It does tend to make her drowsy but she is able to handle that. She is also on diclofenac 75 mg twice a day through her PCP. She did have an x-ray done that showed a anterolisthesis L4 on 5 with degenerative disc disease at L5-S1. 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 She smokes an average of 1 cigarette/day. She denies fever bowel bladder dysfunction. Physical Exam: The patient is a 67-year-old female well-developed well-nourished who is alert and oriented with a normal mood and affect. She has a full weightbearing nonantalgic gait. She did not use any assist device. Her gait is a little bit slow. She has 4 out of 5 strength bilateral lower extremities. Negative straight leg raise. She has pain with hyperextension lumbar spine. Assessment and Plan: This is a patient who has anterolisthesis and degenerative disc disease that gives her back pain and leg pain. I will increase her Lyrica to 70 5 in the morning and 150 at night. Hopefully this will not make her too sleepy. I have printed off a physical therapy exercise program for her to do at home as she states she cannot get out to do physical therapy on outpatient basis. She will continue her diclofenac. We will see her back in 6 weeks. If she is not improved we will get a MRI of the lumbar spine.     Pat was seen today for

## 2023-07-31 ENCOUNTER — HOSPITAL ENCOUNTER (EMERGENCY)
Facility: HOSPITAL | Age: 58
Discharge: HOME | End: 2023-07-31
Attending: STUDENT IN AN ORGANIZED HEALTH CARE EDUCATION/TRAINING PROGRAM
Payer: COMMERCIAL

## 2023-07-31 ENCOUNTER — APPOINTMENT (OUTPATIENT)
Facility: HOSPITAL | Age: 58
End: 2023-07-31
Payer: COMMERCIAL

## 2023-07-31 VITALS
RESPIRATION RATE: 18 BRPM | OXYGEN SATURATION: 97 % | HEART RATE: 107 BPM | DIASTOLIC BLOOD PRESSURE: 78 MMHG | HEIGHT: 63 IN | BODY MASS INDEX: 35.79 KG/M2 | TEMPERATURE: 99.1 F | WEIGHT: 202 LBS | SYSTOLIC BLOOD PRESSURE: 140 MMHG

## 2023-07-31 DIAGNOSIS — R51.9 ACUTE NONINTRACTABLE HEADACHE, UNSPECIFIED HEADACHE TYPE: Primary | ICD-10-CM

## 2023-07-31 LAB
ANION GAP SERPL CALC-SCNC: 6 MMOL/L (ref 3–18)
BASOPHILS # BLD: 0 K/UL (ref 0–0.1)
BASOPHILS NFR BLD: 1 % (ref 0–2)
BUN SERPL-MCNC: 25 MG/DL (ref 7–18)
BUN/CREAT SERPL: 18 (ref 12–20)
CALCIUM SERPL-MCNC: 9.7 MG/DL (ref 8.5–10.1)
CHLORIDE SERPL-SCNC: 98 MMOL/L (ref 100–111)
CO2 SERPL-SCNC: 31 MMOL/L (ref 21–32)
CREAT SERPL-MCNC: 1.39 MG/DL (ref 0.6–1.3)
DIFFERENTIAL METHOD BLD: ABNORMAL
EOSINOPHIL # BLD: 0.1 K/UL (ref 0–0.4)
EOSINOPHIL NFR BLD: 2 % (ref 0–5)
ERYTHROCYTE [DISTWIDTH] IN BLOOD BY AUTOMATED COUNT: 13.8 % (ref 11.6–14.5)
GLUCOSE BLD STRIP.AUTO-MCNC: 105 MG/DL (ref 70–110)
GLUCOSE SERPL-MCNC: 103 MG/DL (ref 74–99)
HCT VFR BLD AUTO: 41.6 % (ref 35–45)
HGB BLD-MCNC: 13.6 G/DL (ref 12–16)
IMM GRANULOCYTES # BLD AUTO: 0 K/UL (ref 0–0.04)
IMM GRANULOCYTES NFR BLD AUTO: 0 % (ref 0–0.5)
LYMPHOCYTES # BLD: 1.9 K/UL (ref 0.9–3.6)
LYMPHOCYTES NFR BLD: 46 % (ref 21–52)
MAGNESIUM SERPL-MCNC: 2.3 MG/DL (ref 1.6–2.6)
MCH RBC QN AUTO: 28.6 PG (ref 24–34)
MCHC RBC AUTO-ENTMCNC: 32.7 G/DL (ref 31–37)
MCV RBC AUTO: 87.6 FL (ref 78–100)
MONOCYTES # BLD: 0.3 K/UL (ref 0.05–1.2)
MONOCYTES NFR BLD: 8 % (ref 3–10)
NEUTS SEG # BLD: 1.7 K/UL (ref 1.8–8)
NEUTS SEG NFR BLD: 42 % (ref 40–73)
NRBC # BLD: 0 K/UL (ref 0–0.01)
NRBC BLD-RTO: 0 PER 100 WBC
PLATELET # BLD AUTO: 212 K/UL (ref 135–420)
PMV BLD AUTO: 9.3 FL (ref 9.2–11.8)
POTASSIUM SERPL-SCNC: 3.6 MMOL/L (ref 3.5–5.5)
RBC # BLD AUTO: 4.75 M/UL (ref 4.2–5.3)
SODIUM SERPL-SCNC: 135 MMOL/L (ref 136–145)
WBC # BLD AUTO: 4.1 K/UL (ref 4.6–13.2)

## 2023-07-31 PROCEDURE — 93005 ELECTROCARDIOGRAM TRACING: CPT | Performed by: STUDENT IN AN ORGANIZED HEALTH CARE EDUCATION/TRAINING PROGRAM

## 2023-07-31 PROCEDURE — 6360000002 HC RX W HCPCS: Performed by: STUDENT IN AN ORGANIZED HEALTH CARE EDUCATION/TRAINING PROGRAM

## 2023-07-31 PROCEDURE — 85025 COMPLETE CBC W/AUTO DIFF WBC: CPT

## 2023-07-31 PROCEDURE — 96374 THER/PROPH/DIAG INJ IV PUSH: CPT

## 2023-07-31 PROCEDURE — 80048 BASIC METABOLIC PNL TOTAL CA: CPT

## 2023-07-31 PROCEDURE — 99284 EMERGENCY DEPT VISIT MOD MDM: CPT

## 2023-07-31 PROCEDURE — 70450 CT HEAD/BRAIN W/O DYE: CPT

## 2023-07-31 PROCEDURE — 96361 HYDRATE IV INFUSION ADD-ON: CPT

## 2023-07-31 PROCEDURE — 83735 ASSAY OF MAGNESIUM: CPT

## 2023-07-31 PROCEDURE — 96375 TX/PRO/DX INJ NEW DRUG ADDON: CPT

## 2023-07-31 PROCEDURE — 2580000003 HC RX 258: Performed by: STUDENT IN AN ORGANIZED HEALTH CARE EDUCATION/TRAINING PROGRAM

## 2023-07-31 PROCEDURE — 82962 GLUCOSE BLOOD TEST: CPT

## 2023-07-31 RX ORDER — METOCLOPRAMIDE HYDROCHLORIDE 5 MG/ML
10 INJECTION INTRAMUSCULAR; INTRAVENOUS
Status: COMPLETED | OUTPATIENT
Start: 2023-07-31 | End: 2023-07-31

## 2023-07-31 RX ORDER — KETOROLAC TROMETHAMINE 15 MG/ML
15 INJECTION, SOLUTION INTRAMUSCULAR; INTRAVENOUS
Status: COMPLETED | OUTPATIENT
Start: 2023-07-31 | End: 2023-07-31

## 2023-07-31 RX ORDER — 0.9 % SODIUM CHLORIDE 0.9 %
500 INTRAVENOUS SOLUTION INTRAVENOUS ONCE
Status: COMPLETED | OUTPATIENT
Start: 2023-07-31 | End: 2023-07-31

## 2023-07-31 RX ORDER — DIPHENHYDRAMINE HYDROCHLORIDE 50 MG/ML
25 INJECTION INTRAMUSCULAR; INTRAVENOUS
Status: COMPLETED | OUTPATIENT
Start: 2023-07-31 | End: 2023-07-31

## 2023-07-31 RX ADMIN — METOCLOPRAMIDE 10 MG: 5 INJECTION, SOLUTION INTRAMUSCULAR; INTRAVENOUS at 16:26

## 2023-07-31 RX ADMIN — SODIUM CHLORIDE 500 ML: 9 INJECTION, SOLUTION INTRAVENOUS at 16:26

## 2023-07-31 RX ADMIN — KETOROLAC TROMETHAMINE 15 MG: 15 INJECTION, SOLUTION INTRAMUSCULAR; INTRAVENOUS at 16:40

## 2023-07-31 RX ADMIN — DIPHENHYDRAMINE HYDROCHLORIDE 25 MG: 50 INJECTION, SOLUTION INTRAMUSCULAR; INTRAVENOUS at 16:25

## 2023-07-31 ASSESSMENT — ENCOUNTER SYMPTOMS
CHEST TIGHTNESS: 0
ABDOMINAL PAIN: 0
SHORTNESS OF BREATH: 0
DIARRHEA: 0
NAUSEA: 0
VOMITING: 0

## 2023-07-31 ASSESSMENT — PAIN DESCRIPTION - DESCRIPTORS: DESCRIPTORS: ACHING

## 2023-07-31 ASSESSMENT — LIFESTYLE VARIABLES
HOW MANY STANDARD DRINKS CONTAINING ALCOHOL DO YOU HAVE ON A TYPICAL DAY: PATIENT DOES NOT DRINK
HOW OFTEN DO YOU HAVE A DRINK CONTAINING ALCOHOL: NEVER

## 2023-07-31 ASSESSMENT — PAIN SCALES - GENERAL
PAINLEVEL_OUTOF10: 7
PAINLEVEL_OUTOF10: 7

## 2023-07-31 ASSESSMENT — PAIN DESCRIPTION - LOCATION: LOCATION: HEAD

## 2023-07-31 ASSESSMENT — PAIN DESCRIPTION - ORIENTATION: ORIENTATION: POSTERIOR;MID;LOWER

## 2023-07-31 NOTE — ED NOTES
Discharge instructions reviewed with patient. Patient verbalized understanding. Patient advised to follow up as directed on discharge instructions. Patient denies questions, needs or concerns at this time. Patient verbalized understanding. No s/sx of distress noted.         Мария Gunderson RN  07/31/23 2357

## 2023-07-31 NOTE — ED PROVIDER NOTES
EMERGENCY DEPARTMENT HISTORY AND PHYSICAL EXAM      Date: 7/31/2023  Patient Name: Kevin Lopez    History of Presenting Illness     Chief Complaint   Patient presents with    Headache    Numbness       Patient is a 46-year-old female with history of hypertension, hypercholesterolemia, glaucoma, migraine headaches presenting to the emergency department for evaluation of headache and numbness to the left side of her face. Patient states that headache has been ongoing the past 3 days. On the left side of her head. States that she felt like the left side of her face started not numb earlier today. Also states that her arms are feeling weak when she was brushing her hair. She denies any facial droop or difficulty with speech                PCP: Leavy Paget, MD    Current Facility-Administered Medications   Medication Dose Route Frequency Provider Last Rate Last Admin    sodium chloride 0.9 % bolus 500 mL  500 mL IntraVENous Once Memo Craw, .9 mL/hr at 07/31/23 1626 500 mL at 07/31/23 1626     Current Outpatient Medications   Medication Sig Dispense Refill    LUMIGAN 0.01 % SOLN ophthalmic drops       pregabalin (LYRICA) 75 MG capsule 1 cap in the am and 2 caps in the pm 90 capsule 1    diclofenac (VOLTAREN) 75 MG EC tablet 1 tablet daily as needed      pregabalin (LYRICA) 75 MG capsule One po qhs x 1 week, then may increase to one po BID as tolerated. (Patient taking differently: Take 1 capsule by mouth.  One po qhs x 1 week, then may increase to one po BID as tolerated.) 60 capsule 2    amLODIPine (NORVASC) 5 MG tablet amlodipine 5 mg tablet   TAKE 1 TABLET BY MOUTH ONCE DAILY FOR 90 DAYS      ascorbic acid (VITAMIN C) 1000 MG tablet Take 1 tablet by mouth daily      aspirin 81 MG EC tablet Take 1 tablet by mouth daily      bimatoprost (LUMIGAN) 0.03 % ophthalmic drops Place 1 drop into both eyes nightly      Cholecalciferol 50 MCG (2000 UT) TABS Take 1 tablet by mouth Daily      diclofenac

## 2023-07-31 NOTE — ED TRIAGE NOTES
Patient  A/O x 4, presented to the ED with complaint of headache x 3 days, numbness to left side of face x 3 hours ago. Patient states she was brushing her hair x 1 hour ago and noted her left arm was weak.

## 2023-08-01 LAB
EKG ATRIAL RATE: 101 BPM
EKG DIAGNOSIS: NORMAL
EKG P AXIS: 49 DEGREES
EKG P-R INTERVAL: 164 MS
EKG Q-T INTERVAL: 330 MS
EKG QRS DURATION: 70 MS
EKG QTC CALCULATION (BAZETT): 427 MS
EKG R AXIS: 3 DEGREES
EKG T AXIS: 15 DEGREES
EKG VENTRICULAR RATE: 101 BPM

## 2023-08-01 PROCEDURE — 93010 ELECTROCARDIOGRAM REPORT: CPT | Performed by: INTERNAL MEDICINE

## 2023-11-02 ENCOUNTER — OFFICE VISIT (OUTPATIENT)
Age: 58
End: 2023-11-02
Payer: COMMERCIAL

## 2023-11-02 VITALS
BODY MASS INDEX: 35.44 KG/M2 | HEIGHT: 63 IN | OXYGEN SATURATION: 99 % | TEMPERATURE: 97 F | HEART RATE: 94 BPM | WEIGHT: 200 LBS

## 2023-11-02 DIAGNOSIS — M43.16 SPONDYLOLISTHESIS AT L4-L5 LEVEL: ICD-10-CM

## 2023-11-02 DIAGNOSIS — M48.062 SPINAL STENOSIS OF LUMBAR REGION WITH NEUROGENIC CLAUDICATION: Primary | ICD-10-CM

## 2023-11-02 PROCEDURE — 99214 OFFICE O/P EST MOD 30 MIN: CPT | Performed by: PHYSICAL MEDICINE & REHABILITATION

## 2023-11-02 RX ORDER — MELOXICAM 15 MG/1
TABLET ORAL
COMMUNITY
Start: 2023-10-19

## 2023-11-02 RX ORDER — PREGABALIN 75 MG/1
CAPSULE ORAL
Qty: 270 CAPSULE | Refills: 0 | Status: SHIPPED | OUTPATIENT
Start: 2023-11-02 | End: 2024-01-01

## 2023-11-02 NOTE — PROGRESS NOTES
Shanita Christensen presents today for   Chief Complaint   Patient presents with    Lower Back Pain       Is someone accompanying this pt? no    Is the patient using any DME equipment during OV? no    Coordination of Care:  1. Have you been to the ER, urgent care clinic since your last visit? no  Hospitalized since your last visit? no    2. Have you seen or consulted any other health care providers outside of the [address] [place] [address] since your last visit? Yes, PCP Include any pap smears or colon screening.  no

## 2023-11-02 NOTE — PROGRESS NOTES
[facility]    [address] [place] [address] Dr  Phone: [phone]  Fax: [phone]        Chente Marlow  : 1965  PCP: Fermin Wilhelm MD    PROGRESS NOTE      ASSESSMENT AND PLAN    Pat was seen today for lower back pain. Diagnoses and all orders for this visit:    Spinal stenosis of lumbar region with neurogenic claudication  -     MRI LUMBAR SPINE WO CONTRAST; Future  -     pregabalin (LYRICA) 75 MG capsule; 1 cap in the am and 2 caps in the pm    Spondylolisthesis at L4-L5 level  -     MRI LUMBAR SPINE WO CONTRAST; Future  -     pregabalin (LYRICA) 75 MG capsule; 1 cap in the am and 2 caps in the pm  -     diclofenac sodium (VOLTAREN) 1 % GEL; Apply 4 g topically 4 times daily        Vicki Reis is a 63 y.o. female with progressive symptoms of lumbar stenosis. Recommend lumbar MRI due to progressive symptoms, failure of NSAIDs, HEP, and Lyrica. She is concerned about her co-pay and will look into her insurance. Mild benefit with Lyrica 75 twice daily. She did not increase to 75/150 mg as directed last visit. Unable to increase daytime dose due to somnolence. Refilled Lyrica, instructed to take 75/150 mg. Refilled diclofenac gel. Recently started on meloxicam by Dr. Hemant Escalona. Advised patient not to combine this with other OTC or prescription NSAIDs. Follow-up and Dispositions    Return in about 4 weeks (around 2023) for fu MRI/CT/EDx. HISTORY OF PRESENT ILLNESS      Pat Koenig is a 63 y.o. female presents for follow up of low back pain. LV NP Young 2023, inc Lyrica to 75/150, given HEP, cont. Voltaren gel. Patient continues with low back pain with intermittent left sciatic pain, described as feeling like pouring cold water on it. She has difficulty with her daily routine including twisting and reaching being her back while using the bathroom. She cannot tolerate prolonged walking.  She

## 2023-11-03 RX ORDER — PREGABALIN 75 MG/1
CAPSULE ORAL
Qty: 60 CAPSULE | Refills: 0 | OUTPATIENT
Start: 2023-11-03

## 2024-01-26 ENCOUNTER — HOSPITAL ENCOUNTER (OUTPATIENT)
Facility: HOSPITAL | Age: 59
Setting detail: RECURRING SERIES
Discharge: HOME | End: 2024-01-29
Payer: COMMERCIAL

## 2024-01-26 PROCEDURE — 97161 PT EVAL LOW COMPLEX 20 MIN: CPT

## 2024-01-26 NOTE — THERAPY EVALUATION
sup  [] pro  [] sidelying   [] Other:     General Health:  Red Flags Indicated? [] Yes    [x] No  [] Yes [x] No Recent weight change (If yes, due to dieting? [] Yes  [] No)   [] Yes [x] No Weakness in legs during walking  [] Yes [x] No Unremitting pain at night  [] Yes [x] No Abdominal pain or problems  [] Yes [x] No Rectal bleeding  [] Yes [x] No Feet more cold or painful in cold weather  [] Yes [x] No Menstrual irregularities  [] Yes [x] No Blood or pain with urination  [] Yes [x] No Dysfunction of bowel or bladder  [] Yes [x] No Recent illness within past 3 weeks (i.e, cold, flu)  [] Yes [x] No Numbness/tingling in buttock/genitalia region    Past History/Treatments:  see above    Diagnostic Tests: [] Lab work [x] X-rays    [] CT [x] MRI     [] Other:  Results: per epic: L XR AP/lat/flex/ext 4V 4/13/2023 (I personally reviewed these images): calcified fibroid in pelvis, grade one listhesis L4-5, no instability  L MRI 2015: multilevel degenerative changes, mild edema L4-5 with mild epidural lipomatosis  Spine surgery consult: non     MRI of the R knee:   1.  No evidence of significant meniscal or ligamentous injury.     2. Mild distal quadriceps and proximal patellar tendinopathy.     3. Mild to moderate tricompartmental degenerative chondromalacia.       Functional Status  Prior level of function: walking at least 10 minutes and in grocery store  Present functional limitations: limited with bending, twisting, limited walking for <5 minutes   What position do you sleep in?:sidelying    OBJECTIVE  Posture:  Lateral Shift: [] R    [] L     [] +  [x] -  Kyphosis: [x] Increased [] Decreased   []  WNL  Lordosis:  [] Increased [x] Decreased   [] WNL  Pelvic symmetry: [x] WNL    [] Other:    Gait:  [] Normal     [x] Abnormal: reduced stance time on R leg with knee immobilizer intact    Active Movements: [] N/A   [] Too acute   [] Other:  ROM % AROM % PROM Comments:pain, area   Forward flexion 40-60 80%     Extension

## 2024-01-26 NOTE — THERAPY EVALUATION
ADLs.  Evaluation:  7/10  2. Patient will demonstrate AROM Lumbar Spine Flexion by 20%, extension 25%, rotation by 50% to increase ease of ADLs.  Evaluation:  Active Lumbar Spine ROM Flexion 80%, Extension 25%, Rotation R and L 25% with pain with rotation and extension   3. Patient will increase FOTO score to  42 to indicate increased functional mobility.  Evaluation:  24  4. Patient will tolerate standing for  30 minutes.  Evaluation:  <10 minutes      Frequency / Duration: Patient to be seen 2 times per week for 8 WEEKS    Patient/ Caregiver education and instruction: Diagnosis, prognosis, self care and exercises [x]  Plan of care has been reviewed with PTA    Certification Period: 01/26/2024 to 03/22/2024    Lesli Max, PT       1/26/2024       2:00 PM    Payor: JULIANNE / Plan: JULIANNE BOND ([facility] / Product Type: *No Product type* /     Physician signature required for Medicare, Medicaid, Worker's Comp, Direct Access   ===================================================================  I certify that the above Therapy Services are being furnished while the patient is under my care. I agree with the treatment plan and certify that this therapy is necessary.    Physician's Signature:_________________________   DATE:_________   TIME:________                           Medardo Chris MD    ** Signature, Date and Time must be completed for valid certification **  [facility]. Thank you

## 2024-02-12 ENCOUNTER — HOSPITAL ENCOUNTER (OUTPATIENT)
Facility: HOSPITAL | Age: 59
Setting detail: RECURRING SERIES
Discharge: HOME | End: 2024-02-15
Payer: COMMERCIAL

## 2024-02-12 PROCEDURE — 97110 THERAPEUTIC EXERCISES: CPT

## 2024-02-12 PROCEDURE — 97112 NEUROMUSCULAR REEDUCATION: CPT

## 2024-02-12 PROCEDURE — G0283 ELEC STIM OTHER THAN WOUND: HCPCS

## 2024-02-12 PROCEDURE — 97530 THERAPEUTIC ACTIVITIES: CPT

## 2024-02-15 ENCOUNTER — HOSPITAL ENCOUNTER (OUTPATIENT)
Facility: HOSPITAL | Age: 59
Setting detail: RECURRING SERIES
Discharge: HOME | End: 2024-02-18
Payer: COMMERCIAL

## 2024-02-15 PROCEDURE — G0283 ELEC STIM OTHER THAN WOUND: HCPCS

## 2024-02-15 PROCEDURE — 97530 THERAPEUTIC ACTIVITIES: CPT

## 2024-02-15 PROCEDURE — 97112 NEUROMUSCULAR REEDUCATION: CPT

## 2024-02-15 PROCEDURE — 97110 THERAPEUTIC EXERCISES: CPT

## 2024-02-15 NOTE — PROGRESS NOTES
PHYSICAL / OCCUPATIONAL THERAPY - DAILY TREATMENT NOTE    Patient Name: Pat Jeffries    Date: 2/15/2024    : 1965  Insurance: Payor: JULIANNE / Plan: JULIANNE BOND HMO / Product Type: *No Product type* /      Patient  verified Yes     Visit #   Current / Total 3 16   Time   In / Out 1145 1230   Pain   In / Out 7 6   Subjective Functional Status/Changes: Patient states she felt better after having the stem at last therapy session.      TREATMENT AREA =  Other low back pain [M54.59]    OBJECTIVE    Estim Unattended (UNTIMED), with HEAT type/location: prone LS     Min Rationale   10 decrease pain and increase tissue extensibility to improve patient's ability to progress to PLOF and address remaining functional goals.     Skin assessment post-treatment:   Intact      Therapeutic Procedures:     15940 Therapeutic Exercise (timed):  increase ROM, strength, coordination, balance, and proprioception to improve patient's ability to progress to PLOF and address remaining functional goals.   Tx Min Billable or 1:1 Min   (if diff from Tx Min) Details:   13   See flow sheet as applicable      48537 Neuromuscular Re-Education (timed):  improve balance, coordination, kinesthetic sense, posture, core stability and proprioception to improve patient's ability to develop conscious control of individual muscles and awareness of position of extremities in order to progress to PLOF and address remaining functional goals.   Tx Min Billable or 1:1 Min   (if diff from Tx Min) Details:   12   See flow sheet as applicable      82287 Therapeutic Activity (timed):  use of dynamic activities replicating functional movements to increase ROM, strength, coordination, balance, and proprioception in order to improve patient's ability to progress to PLOF and address remaining functional goals.   Tx Min Billable or 1:1 Min   (if diff from Tx Min) Details:   10   See flow sheet as applicable         35    [facility] Totals Reminder: bill

## 2024-03-01 ENCOUNTER — HOSPITAL ENCOUNTER (OUTPATIENT)
Facility: HOSPITAL | Age: 59
Setting detail: RECURRING SERIES
Discharge: HOME | End: 2024-03-04
Payer: COMMERCIAL

## 2024-03-01 PROCEDURE — 97112 NEUROMUSCULAR REEDUCATION: CPT

## 2024-03-01 PROCEDURE — G0283 ELEC STIM OTHER THAN WOUND: HCPCS

## 2024-03-01 PROCEDURE — 97110 THERAPEUTIC EXERCISES: CPT

## 2024-03-01 PROCEDURE — 97530 THERAPEUTIC ACTIVITIES: CPT

## 2024-03-01 NOTE — PROGRESS NOTES
[facility]  [address] [address] Ph: [phone] Fx: [phone]  PHYSICAL THERAPY PROGRESS NOTE  Patient Name: Pat Jeffries : 1965   Treatment/Medical Diagnosis: Other low back pain [M54.59]   Referral Source: Medardo Chris MD     Date of Initial Visit: 24 Attended Visits: 4 Missed Visits: 0     SUMMARY OF TREATMENT  Patient has only had 3 follow up appt since initial evaluation on 24 due to awaiting insurance auth and then conflicting schedules. Patient continues to have high ratings of pain and limited standing tolerance of 8 mins.     CURRENT STATUS  Short Term Goals: To be accomplished in 4 WEEKS  1.  Patient will become proficient in their HEP and will be compliant in performing that program.  Evaluation:  instructed on HEP with issued program/   Current: progressing: patient reports performing HEP 3x a week.      Long Term Goals: To be accomplished in 8 WEEKS  1. Patient's pain level will be 4/10 with activity in order to improve patient's ability to perform normal ADLs.  Evaluation:  7/10  Current; regressed: pain at worse 8/10.   2. Patient will demonstrate AROM Lumbar Spine Flexion by 20%, extension 25%, rotation by 50% to increase ease of ADLs.  Evaluation:  Active Lumbar Spine ROM Flexion 80%, Extension 25%, Rotation R and L 25% with pain with rotation and extension   Current: progressing; AROM lumbar spine flexion 50% limited, extension limited 50%, B sidebend limited 25%, B rotation limited 50%.   3. Patient will increase FOTO score to  42 to indicate increased functional mobility.  Evaluation:  24  Current; regressed; FOTO = 19.   4. Patient will tolerate standing for  30 minutes.  Evaluation:  <10 minutes  Current:  regressed: standing 8 mins.          Payor: JULIANNE / Plan: JULIANNE VANTA HMO / Product Type: *No Product type* /     Non-Medicare, can change goals, can adjust or add frequency duration, no 
Active Lumbar Spine ROM Flexion 80%, Extension 25%, Rotation R and L 25% with pain with rotation and extension   Current: progressing; AROM lumbar spine flexion 50% limited, extension limited 50%, B sidebend limited 25%, B rotation limited 50%. 3/1/24  3. Patient will increase FOTO score to  42 to indicate increased functional mobility.  Evaluation:  24  Current; regressed; FOTO = 19. 3/1/24  4. Patient will tolerate standing for  30 minutes.  Evaluation:  <10 minutes  Current:  regressed: standing 8 mins. 3/1/24     Next PN/ RC duetoday  Auth due 4/30/24; 16 visits       PLAN  - Continue Plan of Care    FRANCISCA PARK, [facility]    3/1/2024    2:26 PM    Future Appointments   Date Time Provider Department Center   3/14/2024  1:40 PM Sonia Momin MD [facility]

## 2024-03-04 ENCOUNTER — HOSPITAL ENCOUNTER (OUTPATIENT)
Facility: HOSPITAL | Age: 59
Setting detail: RECURRING SERIES
Discharge: HOME | End: 2024-03-07
Payer: COMMERCIAL

## 2024-03-04 ENCOUNTER — TELEPHONE (OUTPATIENT)
Facility: HOSPITAL | Age: 59
End: 2024-03-04

## 2024-03-04 PROCEDURE — 97110 THERAPEUTIC EXERCISES: CPT

## 2024-03-04 PROCEDURE — 97140 MANUAL THERAPY 1/> REGIONS: CPT

## 2024-03-04 PROCEDURE — G0283 ELEC STIM OTHER THAN WOUND: HCPCS

## 2024-03-04 PROCEDURE — 97112 NEUROMUSCULAR REEDUCATION: CPT

## 2024-03-04 PROCEDURE — 97530 THERAPEUTIC ACTIVITIES: CPT

## 2024-03-04 NOTE — TELEPHONE ENCOUNTER
Called pt to offer her an opening we had today at 1340 since she was on our Wait List. She accepted.

## 2024-03-04 NOTE — PROGRESS NOTES
performing that program.  AT PN:  progressing: patient reports performing HEP 3x a week.     Next PN/ RC due 4/1/24  Auth due 16th visit or 4/30/24    PLAN  - Continue Plan of Care    BEBO PARK PTA    3/4/2024    1:43 PM    Future Appointments   Date Time Provider Department Center   3/5/2024  3:40 PM Lesli Max, PT [facility]   3/11/2024  2:20 PM Tejal Lopez PTA [facility]   3/14/2024  1:40 PM Sonia Momin MD [facility]   3/15/2024  2:20 PM Lesli Max, PT [facility]   3/18/2024  1:40 PM Bebo Park PTA [facility]   3/22/2024  1:40 PM Bebo Park PTA [facility]   3/25/2024  1:40 PM Bebo Park, PTA [facility]

## 2024-03-05 ENCOUNTER — HOSPITAL ENCOUNTER (OUTPATIENT)
Facility: HOSPITAL | Age: 59
Setting detail: RECURRING SERIES
Discharge: HOME | End: 2024-03-08
Payer: COMMERCIAL

## 2024-03-05 PROCEDURE — 97110 THERAPEUTIC EXERCISES: CPT

## 2024-03-05 PROCEDURE — 97530 THERAPEUTIC ACTIVITIES: CPT

## 2024-03-05 PROCEDURE — G0283 ELEC STIM OTHER THAN WOUND: HCPCS

## 2024-03-05 PROCEDURE — 97112 NEUROMUSCULAR REEDUCATION: CPT

## 2024-03-05 NOTE — PROGRESS NOTES
PHYSICAL / OCCUPATIONAL THERAPY - DAILY TREATMENT NOTE    Patient Name: Pat Jeffries    Date: 3/5/2024    : 1965  Insurance: Payor: JULIANNE / Plan: JULIANNE BOND HMO / Product Type: *No Product type* /      Patient  verified Yes     Visit #   Current / Total 2 8   Time   In / Out 3:41 4:30   Pain   In / Out 5 4.5   Subjective Functional Status/Changes: Patient reports the back is doing better, the MRI shows potential tear in the R knee with a slight tear but a lot of inflammation, she is wearing the brace. She goes back in a few weeks to see MD and then potentially with injection to the R knee, has steroids but not real help.     TREATMENT AREA =  Other low back pain [M54.59]    OBJECTIVE      Estim Unattended (UNTIMED), with HEAT type/location: prone; lower back      Min Rationale   10 decrease pain and increase tissue extensibility to improve patient's ability to progress to PLOF and address remaining functional goals.     Skin assessment post-treatment:   Intact      Therapeutic Procedures:     24615 Therapeutic Exercise (timed):  increase ROM, strength, coordination, balance, and proprioception to improve patient's ability to progress to PLOF and address remaining functional goals.   Tx Min Billable or 1:1 Min   (if diff from Tx Min) Details:   23   See flow sheet as applicable      05104 Neuromuscular Re-Education (timed):  improve balance, coordination, kinesthetic sense, posture, core stability and proprioception to improve patient's ability to develop conscious control of individual muscles and awareness of position of extremities in order to progress to PLOF and address remaining functional goals.   Tx Min Billable or 1:1 Min   (if diff from Tx Min) Details:   8   See flow sheet as applicable      33753 Therapeutic Activity (timed):  use of dynamic activities replicating functional movements to increase ROM, strength, coordination, balance, and proprioception in order to improve patient's

## 2024-03-11 ENCOUNTER — HOSPITAL ENCOUNTER (OUTPATIENT)
Facility: HOSPITAL | Age: 59
Setting detail: RECURRING SERIES
Discharge: HOME | End: 2024-03-14
Payer: COMMERCIAL

## 2024-03-11 PROCEDURE — 97530 THERAPEUTIC ACTIVITIES: CPT

## 2024-03-11 PROCEDURE — 97110 THERAPEUTIC EXERCISES: CPT

## 2024-03-11 PROCEDURE — G0283 ELEC STIM OTHER THAN WOUND: HCPCS

## 2024-03-11 PROCEDURE — 97112 NEUROMUSCULAR REEDUCATION: CPT

## 2024-03-11 NOTE — PROGRESS NOTES
PHYSICAL / OCCUPATIONAL THERAPY - DAILY TREATMENT NOTE    Patient Name: Pat Jeffries    Date: 3/11/2024    : 1965  Insurance: Payor: JULIANNE / Plan: JULIANNE BOND HMO / Product Type: *No Product type* /      Patient  verified Yes     Visit #   Current / Total 3 8   Time   In / Out 222 308   Pain   In / Out 5 5   Subjective Functional Status/Changes: Patient arrives with knee brace loosely donned.      TREATMENT AREA =  Other low back pain [M54.59]    OBJECTIVE      Estim Unattended (UNTIMED), with HEAT type/location: prone; lower back      Min Rationale   10 decrease pain and increase tissue extensibility to improve patient's ability to progress to PLOF and address remaining functional goals.     Skin assessment post-treatment:   Intact      Therapeutic Procedures:     08028 Therapeutic Exercise (timed):  increase ROM, strength, coordination, balance, and proprioception to improve patient's ability to progress to PLOF and address remaining functional goals.   Tx Min Billable or 1:1 Min   (if diff from Tx Min) Details:   12 12  See flow sheet as applicable      22843 Neuromuscular Re-Education (timed):  improve balance, coordination, kinesthetic sense, posture, core stability and proprioception to improve patient's ability to develop conscious control of individual muscles and awareness of position of extremities in order to progress to PLOF and address remaining functional goals.   Tx Min Billable or 1:1 Min   (if diff from Tx Min) Details:   16 16  See flow sheet as applicable      40110 Therapeutic Activity (timed):  use of dynamic activities replicating functional movements to increase ROM, strength, coordination, balance, and proprioception in order to improve patient's ability to progress to PLOF and address remaining functional goals.   Tx Min Billable or 1:1 Min   (if diff from Tx Min) Details:   8 8  See flow sheet as applicable         36 36  [facility] Totals Reminder: bill using total billable

## 2024-03-14 ENCOUNTER — OFFICE VISIT (OUTPATIENT)
Age: 59
End: 2024-03-14
Payer: COMMERCIAL

## 2024-03-14 VITALS
WEIGHT: 203 LBS | HEIGHT: 63 IN | BODY MASS INDEX: 35.97 KG/M2 | TEMPERATURE: 97.7 F | HEART RATE: 103 BPM | DIASTOLIC BLOOD PRESSURE: 83 MMHG | OXYGEN SATURATION: 93 % | SYSTOLIC BLOOD PRESSURE: 138 MMHG

## 2024-03-14 DIAGNOSIS — M43.16 SPONDYLOLISTHESIS AT L4-L5 LEVEL: Primary | ICD-10-CM

## 2024-03-14 DIAGNOSIS — M25.532 LEFT WRIST PAIN: ICD-10-CM

## 2024-03-14 DIAGNOSIS — M48.062 SPINAL STENOSIS OF LUMBAR REGION WITH NEUROGENIC CLAUDICATION: ICD-10-CM

## 2024-03-14 PROCEDURE — 3079F DIAST BP 80-89 MM HG: CPT | Performed by: PHYSICAL MEDICINE & REHABILITATION

## 2024-03-14 PROCEDURE — 3075F SYST BP GE 130 - 139MM HG: CPT | Performed by: PHYSICAL MEDICINE & REHABILITATION

## 2024-03-14 PROCEDURE — 99214 OFFICE O/P EST MOD 30 MIN: CPT | Performed by: PHYSICAL MEDICINE & REHABILITATION

## 2024-03-14 RX ORDER — CYCLOBENZAPRINE HCL 10 MG
10 TABLET ORAL EVERY 8 HOURS PRN
COMMUNITY
Start: 2023-11-14

## 2024-03-14 RX ORDER — ACETAMINOPHEN 500 MG
500 TABLET ORAL EVERY 4 HOURS PRN
COMMUNITY
Start: 2023-11-14

## 2024-03-14 RX ORDER — PREGABALIN 75 MG/1
CAPSULE ORAL
Qty: 90 CAPSULE | Refills: 0 | Status: SHIPPED | OUTPATIENT
Start: 2024-03-14 | End: 2024-05-13

## 2024-03-14 ASSESSMENT — PATIENT HEALTH QUESTIONNAIRE - PHQ9
SUM OF ALL RESPONSES TO PHQ QUESTIONS 1-9: 0
2. FEELING DOWN, DEPRESSED OR HOPELESS: 0
SUM OF ALL RESPONSES TO PHQ QUESTIONS 1-9: 0
1. LITTLE INTEREST OR PLEASURE IN DOING THINGS: 0
SUM OF ALL RESPONSES TO PHQ9 QUESTIONS 1 & 2: 0
SUM OF ALL RESPONSES TO PHQ QUESTIONS 1-9: 0
SUM OF ALL RESPONSES TO PHQ QUESTIONS 1-9: 0
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
## 2024-03-14 NOTE — PROGRESS NOTES
Pat Jeffries presents today for   Chief Complaint   Patient presents with    Back Pain     Lower back       Is someone accompanying this pt? no    Is the patient using any DME equipment during OV? no    Coordination of Care:  1. Have you been to the ER, urgent care clinic since your last visit? no  Hospitalized since your last visit? no    2. Have you seen or consulted any other health care providers outside of the [facility] since your last visit? PCP, ortho Include any pap smears or colon screening. no

## 2024-03-15 ENCOUNTER — HOSPITAL ENCOUNTER (OUTPATIENT)
Facility: HOSPITAL | Age: 59
Setting detail: RECURRING SERIES
Discharge: HOME | End: 2024-03-18
Payer: COMMERCIAL

## 2024-03-15 PROCEDURE — 97530 THERAPEUTIC ACTIVITIES: CPT

## 2024-03-15 PROCEDURE — G0283 ELEC STIM OTHER THAN WOUND: HCPCS

## 2024-03-15 PROCEDURE — 97110 THERAPEUTIC EXERCISES: CPT

## 2024-03-15 PROCEDURE — 97112 NEUROMUSCULAR REEDUCATION: CPT

## 2024-03-18 ENCOUNTER — HOSPITAL ENCOUNTER (OUTPATIENT)
Facility: HOSPITAL | Age: 59
Setting detail: RECURRING SERIES
Discharge: HOME | End: 2024-03-21
Payer: COMMERCIAL

## 2024-03-18 PROCEDURE — 97530 THERAPEUTIC ACTIVITIES: CPT

## 2024-03-18 PROCEDURE — 97112 NEUROMUSCULAR REEDUCATION: CPT

## 2024-03-18 PROCEDURE — G0283 ELEC STIM OTHER THAN WOUND: HCPCS

## 2024-03-18 PROCEDURE — 97110 THERAPEUTIC EXERCISES: CPT

## 2024-03-18 NOTE — PROGRESS NOTES
PHYSICAL / OCCUPATIONAL THERAPY - DAILY TREATMENT NOTE    Patient Name: Pat Jeffries    Date: 3/18/2024    : 1965  Insurance: Payor: JULIANNE / Plan: JULIANNE BOND HMO / Product Type: *No Product type* /      Patient  verified Yes     Visit #   Current / Total 5 8   Time   In / Out 142 233   Pain   In / Out 2 3.5   Subjective Functional Status/Changes: Patient states she continues to be limited with bending/lifting activities and sweeping.      TREATMENT AREA =  Other low back pain [M54.59]    OBJECTIVE    Estim Unattended (UNTIMED), with HEAT type/location: prone; lower back      Min Rationale   10 decrease pain and increase tissue extensibility to improve patient's ability to progress to PLOF and address remaining functional goals.     Skin assessment post-treatment:   Intact      Therapeutic Procedures:     25490 Therapeutic Exercise (timed):  increase ROM, strength, coordination, balance, and proprioception to improve patient's ability to progress to PLOF and address remaining functional goals.   Tx Min Billable or 1:1 Min   (if diff from Tx Min) Details:   23   See flow sheet as applicable      83184 Neuromuscular Re-Education (timed):  improve balance, coordination, kinesthetic sense, posture, core stability and proprioception to improve patient's ability to develop conscious control of individual muscles and awareness of position of extremities in order to progress to PLOF and address remaining functional goals.   Tx Min Billable or 1:1 Min   (if diff from Tx Min) Details:   9   See flow sheet as applicable      18888 Therapeutic Activity (timed):  use of dynamic activities replicating functional movements to increase ROM, strength, coordination, balance, and proprioception in order to improve patient's ability to progress to PLOF and address remaining functional goals.   Tx Min Billable or 1:1 Min   (if diff from Tx Min) Details:   9   See flow sheet as applicable         41   [facility] Totals

## 2024-03-20 ENCOUNTER — APPOINTMENT (OUTPATIENT)
Facility: HOSPITAL | Age: 59
End: 2024-03-20
Payer: COMMERCIAL

## 2024-03-22 ENCOUNTER — HOSPITAL ENCOUNTER (OUTPATIENT)
Facility: HOSPITAL | Age: 59
Setting detail: RECURRING SERIES
Discharge: HOME | End: 2024-03-25
Payer: COMMERCIAL

## 2024-03-22 PROCEDURE — 97535 SELF CARE MNGMENT TRAINING: CPT

## 2024-03-22 PROCEDURE — 97110 THERAPEUTIC EXERCISES: CPT

## 2024-03-22 PROCEDURE — 97530 THERAPEUTIC ACTIVITIES: CPT

## 2024-03-22 NOTE — PROGRESS NOTES
[facility]  [address] [facility] [address] Ph: [phone] Fx: [phone]  PHYSICAL THERAPY PROGRESS NOTE  Patient Name: Pat Jeffries : 1965   Treatment/Medical Diagnosis: Other low back pain [M54.59]   Referral Source: Medardo Chris MD     Date of Initial Visit: 24 Attended Visits: 10 Missed Visits: 1     SUMMARY OF TREATMENT  Ms. Jeffries presents for her 10th visit including IE with report of 30% improvement. She reports increased mobility and improved ease of dressing/donning socks and shoes, as well as more upright posture since SOC. Her standing tolerance, although limited to 13 minutes, has increased by 5 minutes over the last month and her max pain levels have decreased to 3.5/10 from 8/10 at her last PN. Functional deficits remain, most notably with regard to her limited ambulation tolerance of only 8-9 minutes, and although progressed, her L/S AROM remains significantly limited during B lateral flexion and B rotation. This limits her ability to complete heavier household cleaning activities. Her FOTO score has increased 17 points but remains functionally low at 36 points. Ms. Jeffries would continue to benefit from skilled PT intervention 2x/week for 4 weeks to continue to increase her L/S mobility and core/LE strength for improved ease of heavier household cleaning duties involving bending and twisting, improved ease of transferring from sitting to standing, increased ambulation tolerance, and overall improved QOL.    CURRENT STATUS  1. Patient's pain level will be 4/10 with activity in order to improve patient's ability to perform normal ADLs.  AT PN: regressed: pain at worse 8/10.   Current: MET, pain at worse 3.5/10 for low back 3/22/24     2. Patient will demonstrate AROM Lumbar Spine Flexion by 20%, extension 25%, rotation by 50% to increase ease of ADLs.  AT PN: progressing; AROM lumbar spine flexion 50% limited, extension

## 2024-03-22 NOTE — PROGRESS NOTES
PHYSICAL / OCCUPATIONAL THERAPY - DAILY TREATMENT NOTE    Patient Name: Pat Jeffries    Date: 3/22/2024    : 1965  Insurance: Payor: JULIANNE / Plan: J[employer]IANNE BOND HMO / Product Type: *No Product type* /      Patient  verified Yes     Visit #   Current / Total 6 8   Time   In / Out 144 235   Pain   In / Out 2 low back, 4 head 1.5   Subjective Functional Status/Changes: Patient reports that she began experiencing \"head pain\" over the last week that occurs intermittently at her occipital lobe and/or the top of her head. She has an appointment scheduled with a neurologist on . She saw her heart MD this past week and was told that everything is \"fine\". She is unsure if PT is causing the head pain or if she is experiencing migraines. She also states that she doesn't drink enough water and wonders if she should also begin drinking electrolytes?    Functional Gains: bending, putting on socks/shoes and dressing more easily, walking more upright  Functional Deficits: standing longer periods, walking tolerance of 8-9 minutes, heavy cleaning remains difficulty   % improvement: 30%  Pain   Average: 2/10       Best: 1/10     Worst: 3.5/10  Patient Goal: \"to be able to get up from sitting more easily, turn to the side, and walk longer time periods (15-20 minutes) \"       TREATMENT AREA =  Other low back pain [M54.59]    OBJECTIVE    Estim Unattended (UNTIMED), with HEAT type/location: prone; lower back      Min Rationale   10 decrease pain and increase tissue extensibility to improve patient's ability to progress to PLOF and address remaining functional goals.     Skin assessment post-treatment:   Intact      Therapeutic Procedures:     44832 Therapeutic Exercise (timed):  increase ROM, strength, coordination, balance, and proprioception to improve patient's ability to progress to PLOF and address remaining functional goals.   Tx Min Billable or 1:1 Min   (if diff from Tx Min) Details:   9 9 See flow sheet as

## 2024-03-25 ENCOUNTER — HOSPITAL ENCOUNTER (OUTPATIENT)
Facility: HOSPITAL | Age: 59
Setting detail: RECURRING SERIES
Discharge: HOME | End: 2024-03-28
Payer: COMMERCIAL

## 2024-03-25 PROCEDURE — G0283 ELEC STIM OTHER THAN WOUND: HCPCS

## 2024-03-25 PROCEDURE — 97530 THERAPEUTIC ACTIVITIES: CPT

## 2024-03-25 PROCEDURE — 97110 THERAPEUTIC EXERCISES: CPT

## 2024-03-25 PROCEDURE — 97112 NEUROMUSCULAR REEDUCATION: CPT

## 2024-03-25 NOTE — PROGRESS NOTES
bill using total billable min of TIMED therapeutic procedures (example: do not include dry needle or estim unattended, both untimed codes, in totals to left)  8-22 min = 1 unit; 23-37 min = 2 units; 38-52 min = 3 units; 53-67 min = 4 units; 68-82 min = 5 units   Total Total             [x]  Patient Education billed concurrently with other procedures   [x] Review HEP    [] Progressed/Changed HEP, detail:    [] Other detail:       Objective Information/Functional Measures/Assessment    Further progressed patient with weightbearing exercises to increase ease with ADLs.     Patient will continue to benefit from skilled PT / OT services to modify and progress therapeutic interventions, analyze and address functional mobility deficits, analyze and address ROM deficits, analyze and address strength deficits, analyze and address soft tissue restrictions, analyze and cue for proper movement patterns, analyze and modify for postural abnormalities, analyze and address imbalance/dizziness, and instruct in home and community integration to address functional deficits and attain remaining goals.    Progress toward goals / Updated goals:  []  See Progress Note/Recertification    New Goals to be achieved in __4__ WEEKS  1. . Patient's pain level will be 1-2/10 with activity in order to improve patient's ability to perform normal ADLs.  PN:  pain at worse 3.5/10 for low back   Current: remains: pain at worse 3/10. 3/25/24     2. Patient will demonstrate AROM Lumbar Spine Flexion by 20%, extension 25%, rotation by 50% to increase ease of ADLs.  PN: extension limited 25%, flexion limited 25%, B SB limited 25%, B rotation limited 50%      3. Patient will increase FOTO score to  42 to indicate increased functional mobility.  PN: 36 points      4. Patient will tolerate standing for  30 minutes.  PN: 13 minutes 3/22/24     5.  Patient will tolerate ambulation for 15 minutes for improved ease of household cleaning.   PN: 8-9

## 2024-03-29 ENCOUNTER — TELEPHONE (OUTPATIENT)
Facility: HOSPITAL | Age: 59
End: 2024-03-29

## 2024-03-29 NOTE — TELEPHONE ENCOUNTER
Pt requested to be put on the Wait List for any opening the week of April 1st any time available. She already has an appt for 4/4/2024 just needs one more for the week.

## 2024-04-02 ENCOUNTER — APPOINTMENT (OUTPATIENT)
Facility: HOSPITAL | Age: 59
End: 2024-04-02
Payer: COMMERCIAL

## 2024-04-03 ENCOUNTER — TELEPHONE (OUTPATIENT)
Facility: HOSPITAL | Age: 59
End: 2024-04-03

## 2024-04-03 NOTE — TELEPHONE ENCOUNTER
Called patient to offer a cancellation on today's schedule. Patient declined, stating that she had an injection and would like to wait until her appt tomorrow. Her 420 appt was moved to 220 per her request.

## 2024-04-04 ENCOUNTER — HOSPITAL ENCOUNTER (OUTPATIENT)
Facility: HOSPITAL | Age: 59
Setting detail: RECURRING SERIES
Discharge: HOME | End: 2024-04-07
Payer: COMMERCIAL

## 2024-04-04 PROCEDURE — 97110 THERAPEUTIC EXERCISES: CPT | Performed by: PHYSICAL THERAPIST

## 2024-04-04 PROCEDURE — 97530 THERAPEUTIC ACTIVITIES: CPT | Performed by: PHYSICAL THERAPIST

## 2024-04-04 PROCEDURE — G0283 ELEC STIM OTHER THAN WOUND: HCPCS | Performed by: PHYSICAL THERAPIST

## 2024-04-04 PROCEDURE — 97112 NEUROMUSCULAR REEDUCATION: CPT | Performed by: PHYSICAL THERAPIST

## 2024-04-04 NOTE — PROGRESS NOTES
PHYSICAL / OCCUPATIONAL THERAPY - DAILY TREATMENT NOTE    Patient Name: Pat Jeffries    Date: 2024    : 1965  Insurance: Payor: JULIANNE / Plan: JULIANNE BOND HMO / Product Type: *No Product type* /      Patient  verified Yes     Visit #   Current / Total 2 8   Time   In / Out 2:20 3:18   Pain   In / Out 0 0   Subjective Functional Status/Changes: Patient notes her pain level has been improving over the past week.  Today was her first day reporting no pain.  About two weeks ago she started to note posterior neck pain.     TREATMENT AREA =  Other low back pain [M54.59]    OBJECTIVE     Estim Unattended (UNTIMED), with HEAT type/location: IFC/lower back   Min Rationale   10 decrease pain to improve patient's ability to progress to PLOF and address remaining functional goals.     Skin assessment post-treatment:   Redness, no adverse reactions     Therapeutic Procedures:    78765 Therapeutic Exercise (timed):  increase ROM, strength, coordination, balance, and proprioception to improve patient's ability to progress to PLOF and address remaining functional goals.   Tx Min Billable or 1:1 Min   (if diff from Tx Min) Details:   23  See flow sheet as applicable     81672 Neuromuscular Re-Education (timed):  improve balance, coordination, kinesthetic sense, posture, core stability and proprioception to improve patient's ability to develop conscious control of individual muscles and awareness of position of extremities in order to progress to PLOF and address remaining functional goals.   Tx Min Billable or 1:1 Min   (if diff from Tx Min) Details:   15  See flow sheet as applicable     42153 Therapeutic Activity (timed):  use of dynamic activities replicating functional movements to increase ROM, strength, coordination, balance, and proprioception in order to improve patient's ability to progress to PLOF and address remaining functional goals.   Tx Min Billable or 1:1 Min   (if diff from Tx Min) Details:

## 2024-04-08 ENCOUNTER — APPOINTMENT (OUTPATIENT)
Facility: HOSPITAL | Age: 59
End: 2024-04-08
Payer: COMMERCIAL

## 2024-04-08 ENCOUNTER — HOSPITAL ENCOUNTER (OUTPATIENT)
Facility: HOSPITAL | Age: 59
Setting detail: RECURRING SERIES
Discharge: HOME | End: 2024-04-11
Payer: COMMERCIAL

## 2024-04-08 PROCEDURE — 97112 NEUROMUSCULAR REEDUCATION: CPT

## 2024-04-08 PROCEDURE — 97530 THERAPEUTIC ACTIVITIES: CPT

## 2024-04-08 PROCEDURE — G0283 ELEC STIM OTHER THAN WOUND: HCPCS

## 2024-04-08 PROCEDURE — 97110 THERAPEUTIC EXERCISES: CPT

## 2024-04-08 NOTE — PROGRESS NOTES
abnormalities, and instruct in home and community integration to address functional deficits and attain remaining goals.    Progress toward goals / Updated goals:  []  See Progress Note/Recertification    1. . Patient's pain level will be 1-2/10 with activity in order to improve patient's ability to perform normal ADLs.  PN:  pain at worse 3.5/10 for low back   Current: remains: pain at worse 3/10. 3/25/24     2. Patient will demonstrate AROM Lumbar Spine Flexion by 20%, extension 25%, rotation by 50% to increase ease of ADLs.  PN: extension limited 25%, flexion limited 25%, B SB limited 25%, B rotation limited 50%      3. Patient will increase FOTO score to  42 to indicate increased functional mobility.  PN: 36 points      4. Patient will tolerate standing for  30 minutes.  PN: 13 minutes 3/22/24     5.  Patient will tolerate ambulation for 15 minutes for improved ease of household cleaning.   PN: 8-9 minutes    Next PN/ RC due 4/22/2024  Auth due (visit number/ date) 16 visits exp 4/30/2024    PLAN  - Continue Plan of Care    Laurence Meyer PT    4/8/2024    11:43 AM    Future Appointments   Date Time Provider Department Center   4/8/2024  4:20 PM Laurence Meyer, PT [facility]   4/11/2024  2:00 PM [facility] 2 [facility] [place]   4/12/2024  2:20 PM Bebo Osman PTA [facility]   4/15/2024  2:20 PM Lesli Max, PT [facility]   4/17/2024  2:30 PM Jerson Hinkle DO [facility]   4/19/2024  2:20 PM Bebo Osman PTA [facility]   4/22/2024  2:20 PM Laurence Meyer, PT [facility]   4/29/2024  2:00 PM Sonia Momin MD [facility]

## 2024-04-12 ENCOUNTER — HOSPITAL ENCOUNTER (OUTPATIENT)
Facility: HOSPITAL | Age: 59
Setting detail: RECURRING SERIES
Discharge: HOME | End: 2024-04-15
Payer: COMMERCIAL

## 2024-04-12 PROCEDURE — 97110 THERAPEUTIC EXERCISES: CPT

## 2024-04-12 PROCEDURE — 97530 THERAPEUTIC ACTIVITIES: CPT

## 2024-04-12 PROCEDURE — 97112 NEUROMUSCULAR REEDUCATION: CPT

## 2024-04-12 PROCEDURE — G0283 ELEC STIM OTHER THAN WOUND: HCPCS

## 2024-04-12 NOTE — PROGRESS NOTES
PHYSICAL / OCCUPATIONAL THERAPY - DAILY TREATMENT NOTE    Patient Name: Pat Jeffries    Date: 2024    : 1965  Insurance: Payor: JULIANNE / Plan: JULIANNE BOND HMO / Product Type: *No Product type* /      Patient  verified Yes     Visit #   Current / Total 4 8   Time   In / Out 220 312   Pain   In / Out 1 0   Subjective Functional Status/Changes: Patient state the pressure in her head is better and she thinks it is coming from the tension in her neck.      TREATMENT AREA =  Other low back pain [M54.59]    OBJECTIVE    Estim Unattended (UNTIMED), with HEAT type/location: prone; lower back      Min Rationale   10 decrease pain and increase tissue extensibility to improve patient's ability to progress to PLOF and address remaining functional goals.     Skin assessment post-treatment:   Intact      Therapeutic Procedures:     34020 Therapeutic Exercise (timed):  increase ROM, strength, coordination, balance, and proprioception to improve patient's ability to progress to PLOF and address remaining functional goals.   Tx Min Billable or 1:1 Min   (if diff from Tx Min) Details:   23   See flow sheet as applicable      29001 Neuromuscular Re-Education (timed):  improve balance, coordination, kinesthetic sense, posture, core stability and proprioception to improve patient's ability to develop conscious control of individual muscles and awareness of position of extremities in order to progress to PLOF and address remaining functional goals.   Tx Min Billable or 1:1 Min   (if diff from Tx Min) Details:   10   See flow sheet as applicable      49252 Therapeutic Activity (timed):  use of dynamic activities replicating functional movements to increase ROM, strength, coordination, balance, and proprioception in order to improve patient's ability to progress to PLOF and address remaining functional goals.   Tx Min Billable or 1:1 Min   (if diff from Tx Min) Details:   9   See flow sheet as applicable         42

## 2024-04-15 ENCOUNTER — HOSPITAL ENCOUNTER (OUTPATIENT)
Facility: HOSPITAL | Age: 59
Setting detail: RECURRING SERIES
Discharge: HOME | End: 2024-04-18
Payer: COMMERCIAL

## 2024-04-15 PROCEDURE — G0283 ELEC STIM OTHER THAN WOUND: HCPCS

## 2024-04-15 PROCEDURE — 97530 THERAPEUTIC ACTIVITIES: CPT

## 2024-04-15 PROCEDURE — 97112 NEUROMUSCULAR REEDUCATION: CPT

## 2024-04-15 PROCEDURE — 97110 THERAPEUTIC EXERCISES: CPT

## 2024-04-15 NOTE — PROGRESS NOTES
PHYSICAL / OCCUPATIONAL THERAPY - DAILY TREATMENT NOTE    Patient Name: Pat Jeffries    Date: 4/15/2024    : 1965  Insurance: Payor: JULIANNE / Plan: JULIANNE BOND HMO / Product Type: *No Product type* /      Patient  verified Yes     Visit #   Current / Total 5 8   Time   In / Out 220 312   Pain   In / Out 0 0   Subjective Functional Status/Changes: Patient states that overall she feels she is getting better.      TREATMENT AREA =  Other low back pain [M54.59]    OBJECTIVE       Estim Unattended (UNTIMED), with HEAT type/location: prone; lower back      Min Rationale   10 decrease pain and increase tissue extensibility to improve patient's ability to progress to PLOF and address remaining functional goals.     Skin assessment post-treatment:   Intact      Therapeutic Procedures:     07374 Therapeutic Exercise (timed):  increase ROM, strength, coordination, balance, and proprioception to improve patient's ability to progress to PLOF and address remaining functional goals.   Tx Min Billable or 1:1 Min   (if diff from Tx Min) Details:   23   See flow sheet as applicable      87652 Neuromuscular Re-Education (timed):  improve balance, coordination, kinesthetic sense, posture, core stability and proprioception to improve patient's ability to develop conscious control of individual muscles and awareness of position of extremities in order to progress to PLOF and address remaining functional goals.   Tx Min Billable or 1:1 Min   (if diff from Tx Min) Details:   10   See flow sheet as applicable      94326 Therapeutic Activity (timed):  use of dynamic activities replicating functional movements to increase ROM, strength, coordination, balance, and proprioception in order to improve patient's ability to progress to PLOF and address remaining functional goals.   Tx Min Billable or 1:1 Min   (if diff from Tx Min) Details:   9   See flow sheet as applicable         42  [facility] Totals Reminder: bill using total

## 2024-04-19 ENCOUNTER — APPOINTMENT (OUTPATIENT)
Facility: HOSPITAL | Age: 59
End: 2024-04-19
Payer: COMMERCIAL

## 2024-04-19 ENCOUNTER — HOSPITAL ENCOUNTER (OUTPATIENT)
Facility: HOSPITAL | Age: 59
Discharge: HOME | End: 2024-04-19
Attending: PHYSICAL MEDICINE & REHABILITATION
Payer: COMMERCIAL

## 2024-04-19 DIAGNOSIS — M48.062 SPINAL STENOSIS OF LUMBAR REGION WITH NEUROGENIC CLAUDICATION: ICD-10-CM

## 2024-04-19 DIAGNOSIS — M43.16 SPONDYLOLISTHESIS AT L4-L5 LEVEL: ICD-10-CM

## 2024-04-19 PROCEDURE — 72148 MRI LUMBAR SPINE W/O DYE: CPT

## 2024-04-22 ENCOUNTER — HOSPITAL ENCOUNTER (OUTPATIENT)
Facility: HOSPITAL | Age: 59
Setting detail: RECURRING SERIES
Discharge: HOME | End: 2024-04-25
Payer: COMMERCIAL

## 2024-04-22 PROCEDURE — 97530 THERAPEUTIC ACTIVITIES: CPT

## 2024-04-22 PROCEDURE — 97110 THERAPEUTIC EXERCISES: CPT

## 2024-04-22 PROCEDURE — 97112 NEUROMUSCULAR REEDUCATION: CPT

## 2024-04-22 PROCEDURE — G0283 ELEC STIM OTHER THAN WOUND: HCPCS
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
## 2024-04-22 NOTE — THERAPY DISCHARGE
Physical Therapy Discharge Instructions      [facility] - [address] [address] Ph: [phone] Fx: [phone]    Patient: Pat Jeffries  : 1965      Continue Home Exercise Program 1 times per day for 4 weeks, then decrease to 3 times per week      Continue with    [] Ice  as needed     [x] Heat           Follow up with MD:     [] Upon completion of therapy     [x] As needed      Recommendations:     [x]   Return to activity with home program    []   Return to activity with the following modifications:       []Post Rehab Program    []Join Independent aquatic program     []Return to/join local gym        Laurence Meyer, PT 2024 12:12 PM

## 2024-04-24 ENCOUNTER — OFFICE VISIT (OUTPATIENT)
Age: 59
End: 2024-04-24
Payer: COMMERCIAL

## 2024-04-24 VITALS — HEIGHT: 63 IN | BODY MASS INDEX: 35.96 KG/M2

## 2024-04-24 DIAGNOSIS — M65.832 EXTENSOR TENOSYNOVITIS OF LEFT WRIST: Primary | ICD-10-CM

## 2024-04-24 PROCEDURE — 99213 OFFICE O/P EST LOW 20 MIN: CPT | Performed by: ORTHOPAEDIC SURGERY

## 2024-04-24 NOTE — PROGRESS NOTES
Pat Jeffries is a 59 y.o. female right handed.  Worker's Compensation and legal considerations: none    Chief Complaint   Patient presents with    Hand Pain     Left hand pain     Pain Score:   7    HPI: Patient presents today with complaints of left hand and wrist pain.    Date of onset: Indeterminate  Injury: No  Prior Treatment:  No    ROS: Review of Systems - General ROS: negative except HPI    Past Medical History:   Diagnosis Date    Bilateral low back pain with left-sided sciatica 10/19/2015    Glaucoma     Glaucoma     Hypercholesterolemia     Hypertension     Left-sided low back pain with left-sided sciatica 2015    Miscarriage 2010    Uterine fibroid        Past Surgical History:   Procedure Laterality Date     SECTION      EMBOLIZATION UTERINE FIBROID          Current Outpatient Medications   Medication Sig Dispense Refill    acetaminophen (TYLENOL) 500 MG tablet Take 1 tablet by mouth every 4 hours as needed for Pain      cyclobenzaprine (FLEXERIL) 10 MG tablet Take 1 tablet by mouth every 8 hours as needed for Muscle spasms      pregabalin (LYRICA) 75 MG capsule 1 cap in the am and 2 caps in the pm 90 capsule 0    diclofenac sodium (VOLTAREN) 1 % GEL Apply 4 g topically 4 times daily 500 g 1    meloxicam (MOBIC) 15 MG tablet       LUMIGAN 0.01 % SOLN ophthalmic drops       amLODIPine (NORVASC) 5 MG tablet amlodipine 5 mg tablet   TAKE 1 TABLET BY MOUTH ONCE DAILY FOR 90 DAYS      ascorbic acid (VITAMIN C) 1000 MG tablet Take 1 tablet by mouth daily      aspirin 81 MG EC tablet Take 1 tablet by mouth daily      bimatoprost (LUMIGAN) 0.03 % ophthalmic drops Place 1 drop into both eyes nightly      Cholecalciferol 50 MCG (2000) TABS Take 1 tablet by mouth Daily      hydroCHLOROthiazide (HYDRODIURIL) 25 MG tablet Take 1 tablet by mouth daily      pravastatin (PRAVACHOL) 20 MG tablet Take 1 tablet by mouth daily       No current facility-administered medications for this visit.

## 2024-04-29 ENCOUNTER — OFFICE VISIT (OUTPATIENT)
Age: 59
End: 2024-04-29
Payer: COMMERCIAL

## 2024-04-29 VITALS
WEIGHT: 204.6 LBS | HEART RATE: 92 BPM | TEMPERATURE: 98.3 F | RESPIRATION RATE: 18 BRPM | DIASTOLIC BLOOD PRESSURE: 75 MMHG | HEIGHT: 63 IN | BODY MASS INDEX: 36.25 KG/M2 | OXYGEN SATURATION: 99 % | SYSTOLIC BLOOD PRESSURE: 132 MMHG

## 2024-04-29 DIAGNOSIS — M48.062 SPINAL STENOSIS OF LUMBAR REGION WITH NEUROGENIC CLAUDICATION: ICD-10-CM

## 2024-04-29 DIAGNOSIS — M43.16 SPONDYLOLISTHESIS AT L4-L5 LEVEL: ICD-10-CM

## 2024-04-29 PROCEDURE — 99214 OFFICE O/P EST MOD 30 MIN: CPT | Performed by: PHYSICAL MEDICINE & REHABILITATION

## 2024-04-29 PROCEDURE — 3075F SYST BP GE 130 - 139MM HG: CPT | Performed by: PHYSICAL MEDICINE & REHABILITATION

## 2024-04-29 PROCEDURE — 3078F DIAST BP <80 MM HG: CPT | Performed by: PHYSICAL MEDICINE & REHABILITATION

## 2024-04-29 RX ORDER — BUTALBITAL, ACETAMINOPHEN AND CAFFEINE 50; 325; 40 MG/1; MG/1; MG/1
TABLET ORAL
COMMUNITY
Start: 2024-03-27

## 2024-04-29 RX ORDER — PREGABALIN 75 MG/1
CAPSULE ORAL
Qty: 180 CAPSULE | Refills: 1 | Status: SHIPPED | OUTPATIENT
Start: 2024-04-29 | End: 2024-06-28

## 2024-04-29 RX ORDER — CYCLOBENZAPRINE HCL 10 MG
5-10 TABLET ORAL NIGHTLY PRN
Qty: 30 TABLET | Refills: 2 | Status: SHIPPED | OUTPATIENT
Start: 2024-04-29

## 2024-04-29 NOTE — PROGRESS NOTES
Pat Jeffries presents today for   Chief Complaint   Patient presents with    Back Problem    Pain    Back Pain       Is someone accompanying this pt? no    Is the patient using any DME equipment during OV? no    Depression Screening:       No data to display                Learning Assessment:  Failed to redirect to the Timeline version of the [employer] SmartLink.    Abuse Screening:       No data to display                Fall Risk  Failed to redirect to the Timeline version of the [employer] SmartLink.    OPIOID RISK TOOL  Failed to redirect to the Timeline version of the [employer] SmartLink.    Coordination of Care:  1. Have you been to the ER, urgent care clinic since your last visit? no  Hospitalized since your last visit? no    2. Have you seen or consulted any other health care providers outside of the [facility] since your last visit? no Include any pap smears or colon screening. no
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
## 2024-04-29 NOTE — PATIENT INSTRUCTIONS
Eat to Reduce Inflammation      Eat more:  Fruits and Veggies  Leafy, green vegetables like spinach  Any veggies you love, especially onions and cabbage  Fruit, especially berries, apples, pears, and citrus  Avocado  Carbs  Beans and legumes  100% whole grain bread, oats, quinoa, brown and wild rice, millet, and corn  Proteins and Fats  Nuts and seeds, and nut butters (especially walnuts, flax seed, and jaye seeds)  Olive oil  Fatty fish, such as salmon, tuna, sardines, and trout  Eggs (especially omega-3 eggs)  Small amounts of grass-fed meats  Herbs and Spices  Ginger and garlic (fresh or dried)  Spices, especially turmeric and cinnamon  Herbs, especially rosemary, thyme, and basil  Red chili peppers  Eat less:  Foods  Baked goods made with white flour, added sugar, and vegetables oils like white bread, cookies, cakes, or pastries  Frozen or boxed meals like pizza, macaroni and cheese, or seasoned pasta meals  Snack foods made with refined carbs or vegetable oils like crackers or chips  Foods high in added sugar like candy or ice cream  French fries and other fried foods  Drinks  Sugar sweetened beverages like soda, sports drinks, energy drinks, coffee drinks, or sweet tea  Alcohol (yes, even red wine!)            © 2022 [employer] All rights reserved.

## 2024-12-19 ENCOUNTER — OFFICE VISIT (OUTPATIENT)
Age: 59
End: 2024-12-19
Payer: COMMERCIAL

## 2024-12-19 VITALS
SYSTOLIC BLOOD PRESSURE: 133 MMHG | WEIGHT: 205 LBS | OXYGEN SATURATION: 97 % | HEIGHT: 63 IN | TEMPERATURE: 97.7 F | BODY MASS INDEX: 36.32 KG/M2 | DIASTOLIC BLOOD PRESSURE: 71 MMHG | HEART RATE: 93 BPM

## 2024-12-19 DIAGNOSIS — M48.062 SPINAL STENOSIS OF LUMBAR REGION WITH NEUROGENIC CLAUDICATION: ICD-10-CM

## 2024-12-19 DIAGNOSIS — M43.16 SPONDYLOLISTHESIS AT L4-L5 LEVEL: ICD-10-CM

## 2024-12-19 PROCEDURE — 3078F DIAST BP <80 MM HG: CPT | Performed by: NURSE PRACTITIONER

## 2024-12-19 PROCEDURE — 3075F SYST BP GE 130 - 139MM HG: CPT | Performed by: NURSE PRACTITIONER

## 2024-12-19 PROCEDURE — 99213 OFFICE O/P EST LOW 20 MIN: CPT | Performed by: NURSE PRACTITIONER

## 2024-12-19 RX ORDER — PREGABALIN 75 MG/1
CAPSULE ORAL
Qty: 180 CAPSULE | Refills: 1 | Status: SHIPPED | OUTPATIENT
Start: 2024-12-31 | End: 2025-02-17
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
## 2025-08-28 ENCOUNTER — OFFICE VISIT (OUTPATIENT)
Age: 60
End: 2025-08-28
Payer: MEDICARE

## 2025-08-28 VITALS
HEART RATE: 100 BPM | OXYGEN SATURATION: 98 % | TEMPERATURE: 98 F | WEIGHT: 207 LBS | BODY MASS INDEX: 36.68 KG/M2 | HEIGHT: 63 IN

## 2025-08-28 DIAGNOSIS — M48.062 SPINAL STENOSIS OF LUMBAR REGION WITH NEUROGENIC CLAUDICATION: ICD-10-CM

## 2025-08-28 DIAGNOSIS — M43.16 SPONDYLOLISTHESIS AT L4-L5 LEVEL: ICD-10-CM

## 2025-08-28 PROCEDURE — 99214 OFFICE O/P EST MOD 30 MIN: CPT | Performed by: NURSE PRACTITIONER

## 2025-08-28 RX ORDER — PREGABALIN 75 MG/1
75 CAPSULE ORAL DAILY
Qty: 90 CAPSULE | Refills: 1 | Status: SHIPPED | OUTPATIENT
Start: 2025-08-28 | End: 2026-02-24